# Patient Record
Sex: FEMALE | Race: OTHER | HISPANIC OR LATINO | URBAN - METROPOLITAN AREA
[De-identification: names, ages, dates, MRNs, and addresses within clinical notes are randomized per-mention and may not be internally consistent; named-entity substitution may affect disease eponyms.]

---

## 2020-04-10 ENCOUNTER — INPATIENT (INPATIENT)
Facility: HOSPITAL | Age: 80
LOS: 0 days | Discharge: ANOTHER HOSPITAL | DRG: 177 | End: 2020-04-11
Attending: INTERNAL MEDICINE | Admitting: INTERNAL MEDICINE
Payer: COMMERCIAL

## 2020-04-10 VITALS
HEIGHT: 60 IN | RESPIRATION RATE: 20 BRPM | WEIGHT: 149.91 LBS | HEART RATE: 109 BPM | DIASTOLIC BLOOD PRESSURE: 71 MMHG | TEMPERATURE: 98 F | SYSTOLIC BLOOD PRESSURE: 147 MMHG | OXYGEN SATURATION: 91 %

## 2020-04-10 DIAGNOSIS — Z01.89 ENCOUNTER FOR OTHER SPECIFIED SPECIAL EXAMINATIONS: ICD-10-CM

## 2020-04-10 DIAGNOSIS — J96.00 ACUTE RESPIRATORY FAILURE, UNSPECIFIED WHETHER WITH HYPOXIA OR HYPERCAPNIA: ICD-10-CM

## 2020-04-10 DIAGNOSIS — Z29.9 ENCOUNTER FOR PROPHYLACTIC MEASURES, UNSPECIFIED: ICD-10-CM

## 2020-04-10 DIAGNOSIS — R09.02 HYPOXEMIA: ICD-10-CM

## 2020-04-10 LAB
ALBUMIN SERPL ELPH-MCNC: 2.3 G/DL — LOW (ref 3.5–5)
ALP SERPL-CCNC: 77 U/L — SIGNIFICANT CHANGE UP (ref 40–120)
ALT FLD-CCNC: 22 U/L DA — SIGNIFICANT CHANGE UP (ref 10–60)
ANION GAP SERPL CALC-SCNC: 6 MMOL/L — SIGNIFICANT CHANGE UP (ref 5–17)
AST SERPL-CCNC: 27 U/L — SIGNIFICANT CHANGE UP (ref 10–40)
BASOPHILS # BLD AUTO: 0.01 K/UL — SIGNIFICANT CHANGE UP (ref 0–0.2)
BASOPHILS NFR BLD AUTO: 0.1 % — SIGNIFICANT CHANGE UP (ref 0–2)
BILIRUB SERPL-MCNC: 0.6 MG/DL — SIGNIFICANT CHANGE UP (ref 0.2–1.2)
BUN SERPL-MCNC: 8 MG/DL — SIGNIFICANT CHANGE UP (ref 7–18)
CALCIUM SERPL-MCNC: 8.7 MG/DL — SIGNIFICANT CHANGE UP (ref 8.4–10.5)
CHLORIDE SERPL-SCNC: 107 MMOL/L — SIGNIFICANT CHANGE UP (ref 96–108)
CO2 SERPL-SCNC: 29 MMOL/L — SIGNIFICANT CHANGE UP (ref 22–31)
CREAT SERPL-MCNC: 0.59 MG/DL — SIGNIFICANT CHANGE UP (ref 0.5–1.3)
D DIMER BLD IA.RAPID-MCNC: 3448 NG/ML DDU — HIGH
EOSINOPHIL # BLD AUTO: 0.09 K/UL — SIGNIFICANT CHANGE UP (ref 0–0.5)
EOSINOPHIL NFR BLD AUTO: 1.1 % — SIGNIFICANT CHANGE UP (ref 0–6)
FIBRINOGEN PPP-MCNC: 1014 MG/DL — HIGH (ref 350–510)
GLUCOSE SERPL-MCNC: 106 MG/DL — HIGH (ref 70–99)
HCT VFR BLD CALC: 36.2 % — SIGNIFICANT CHANGE UP (ref 34.5–45)
HGB BLD-MCNC: 11.6 G/DL — SIGNIFICANT CHANGE UP (ref 11.5–15.5)
IMM GRANULOCYTES NFR BLD AUTO: 1 % — SIGNIFICANT CHANGE UP (ref 0–1.5)
LACTATE SERPL-SCNC: 2.1 MMOL/L — HIGH (ref 0.7–2)
LDH SERPL L TO P-CCNC: 420 U/L — HIGH (ref 120–225)
LYMPHOCYTES # BLD AUTO: 0.5 K/UL — LOW (ref 1–3.3)
LYMPHOCYTES # BLD AUTO: 6 % — LOW (ref 13–44)
MCHC RBC-ENTMCNC: 30.3 PG — SIGNIFICANT CHANGE UP (ref 27–34)
MCHC RBC-ENTMCNC: 32 GM/DL — SIGNIFICANT CHANGE UP (ref 32–36)
MCV RBC AUTO: 94.5 FL — SIGNIFICANT CHANGE UP (ref 80–100)
MONOCYTES # BLD AUTO: 0.67 K/UL — SIGNIFICANT CHANGE UP (ref 0–0.9)
MONOCYTES NFR BLD AUTO: 8 % — SIGNIFICANT CHANGE UP (ref 2–14)
NEUTROPHILS # BLD AUTO: 7.02 K/UL — SIGNIFICANT CHANGE UP (ref 1.8–7.4)
NEUTROPHILS NFR BLD AUTO: 83.8 % — HIGH (ref 43–77)
NRBC # BLD: 0 /100 WBCS — SIGNIFICANT CHANGE UP (ref 0–0)
PLATELET # BLD AUTO: 369 K/UL — SIGNIFICANT CHANGE UP (ref 150–400)
POTASSIUM SERPL-MCNC: 3.6 MMOL/L — SIGNIFICANT CHANGE UP (ref 3.5–5.3)
POTASSIUM SERPL-SCNC: 3.6 MMOL/L — SIGNIFICANT CHANGE UP (ref 3.5–5.3)
PROT SERPL-MCNC: 7.6 G/DL — SIGNIFICANT CHANGE UP (ref 6–8.3)
RBC # BLD: 3.83 M/UL — SIGNIFICANT CHANGE UP (ref 3.8–5.2)
RBC # FLD: 13.7 % — SIGNIFICANT CHANGE UP (ref 10.3–14.5)
SODIUM SERPL-SCNC: 142 MMOL/L — SIGNIFICANT CHANGE UP (ref 135–145)
WBC # BLD: 8.37 K/UL — SIGNIFICANT CHANGE UP (ref 3.8–10.5)
WBC # FLD AUTO: 8.37 K/UL — SIGNIFICANT CHANGE UP (ref 3.8–10.5)

## 2020-04-10 PROCEDURE — 71045 X-RAY EXAM CHEST 1 VIEW: CPT | Mod: 26

## 2020-04-10 PROCEDURE — 99285 EMERGENCY DEPT VISIT HI MDM: CPT

## 2020-04-10 RX ORDER — HYDROXYCHLOROQUINE SULFATE 200 MG
800 TABLET ORAL EVERY 24 HOURS
Refills: 0 | Status: COMPLETED | OUTPATIENT
Start: 2020-04-11 | End: 2020-04-11

## 2020-04-10 RX ORDER — HYDROXYCHLOROQUINE SULFATE 200 MG
TABLET ORAL
Refills: 0 | Status: DISCONTINUED | OUTPATIENT
Start: 2020-04-10 | End: 2020-04-11

## 2020-04-10 RX ORDER — SODIUM CHLORIDE 9 MG/ML
1000 INJECTION INTRAMUSCULAR; INTRAVENOUS; SUBCUTANEOUS ONCE
Refills: 0 | Status: COMPLETED | OUTPATIENT
Start: 2020-04-10 | End: 2020-04-10

## 2020-04-10 RX ORDER — ENOXAPARIN SODIUM 100 MG/ML
40 INJECTION SUBCUTANEOUS
Refills: 0 | Status: DISCONTINUED | OUTPATIENT
Start: 2020-04-10 | End: 2020-04-10

## 2020-04-10 RX ORDER — INSULIN LISPRO 100/ML
VIAL (ML) SUBCUTANEOUS
Refills: 0 | Status: DISCONTINUED | OUTPATIENT
Start: 2020-04-10 | End: 2020-04-11

## 2020-04-10 RX ORDER — ENOXAPARIN SODIUM 100 MG/ML
70 INJECTION SUBCUTANEOUS
Refills: 0 | Status: DISCONTINUED | OUTPATIENT
Start: 2020-04-10 | End: 2020-04-11

## 2020-04-10 RX ORDER — ACETAMINOPHEN 500 MG
650 TABLET ORAL EVERY 4 HOURS
Refills: 0 | Status: DISCONTINUED | OUTPATIENT
Start: 2020-04-10 | End: 2020-04-11

## 2020-04-10 RX ORDER — HYDROXYCHLOROQUINE SULFATE 200 MG
400 TABLET ORAL EVERY 24 HOURS
Refills: 0 | Status: CANCELLED | OUTPATIENT
Start: 2020-04-12 | End: 2020-04-11

## 2020-04-10 RX ADMIN — SODIUM CHLORIDE 1000 MILLILITER(S): 9 INJECTION INTRAMUSCULAR; INTRAVENOUS; SUBCUTANEOUS at 14:24

## 2020-04-10 RX ADMIN — ENOXAPARIN SODIUM 70 MILLIGRAM(S): 100 INJECTION SUBCUTANEOUS at 20:50

## 2020-04-10 RX ADMIN — Medication 650 MILLIGRAM(S): at 20:50

## 2020-04-10 NOTE — H&P ADULT - PROBLEM SELECTOR PLAN 3
IMPROVE VTE Individual Risk Assessment    RISK                                                                Points  [  ] Previous VTE                                                  3  [  ] Thrombophilia                                               2  [  ] Lower limb paralysis                                      2        (unable to hold up >15 seconds)    [  ] Current Cancer                                              2         (within 6 months)  [x ] Immobilization > 24 hrs                                1  [  ] ICU/CCU stay > 24 hours                              1  [x  ] Age > 60                                                      1  IMPROVE VTE Score __DVT ppx Lovenox 70 q 12 ____  )

## 2020-04-10 NOTE — ED ADULT NURSE NOTE - NSIMPLEMENTINTERV_GEN_ALL_ED
Implemented All Fall Risk Interventions:  [place] to call system. Call bell, personal items and telephone within reach. Instruct patient to call for assistance. Room bathroom lighting operational. Non-slip footwear when patient is off stretcher. Physically safe environment: no spills, clutter or unnecessary equipment. Stretcher in lowest position, wheels locked, appropriate side rails in place. Provide visual cue, wrist band, yellow gown, etc. Monitor gait and stability. Monitor for mental status changes and reorient to person, place, and time. Review medications for side effects contributing to fall risk. Reinforce activity limits and safety measures with patient and family.

## 2020-04-10 NOTE — H&P ADULT - HISTORY OF PRESENT ILLNESS
79y /o F with PMH of  htn presenting with  cough since 1 week. It is a/w  Body ache, loss of appetite and generalized weakness. Pt  denies fever. cp, abd pain, diarrhea.  GOC Full code

## 2020-04-10 NOTE — ED PROVIDER NOTE - CLINICAL SUMMARY MEDICAL DECISION MAKING FREE TEXT BOX
Patient presenting with sob, weakness. vital stable but noting hypoxia. patient denies home o2 use. will obtain lab, xray, assess for covid, pna. ed obs and reassess

## 2020-04-10 NOTE — H&P ADULT - ASSESSMENT
79y /o F with PMH of  htn presenting with  cough since 1 week. Admitted to medicine for Pneumonia and suspicion for COVID.

## 2020-04-10 NOTE — H&P ADULT - NSHPPHYSICALEXAM_GEN_ALL_CORE
Vital Signs Last 24 Hrs  T(C): 37.9 (10 Apr 2020 19:08), Max: 37.9 (10 Apr 2020 19:08)  T(F): 100.3 (10 Apr 2020 19:08), Max: 100.3 (10 Apr 2020 19:08)  HR: 104 (10 Apr 2020 19:08) (104 - 109)  BP: 126/65 (10 Apr 2020 19:08) (126/65 - 147/71)  BP(mean): --  RR: 19 (10 Apr 2020 19:08) (19 - 20)  SpO2: 93% (10 Apr 2020 19:08) (91% - 93%)  PHYSICAL EXAM:  GENERAL: NAD, obese  HEAD:  Atraumatic, Normocephalic  EYES:  conjunctiva and sclera clear  NECK: Supple, No JVD, Normal thyroid  CHEST/LUNG: DECREASED BREATH SOUNDS BILATERALLY   HEART: Regular rate and rhythm; No murmurs, rubs, or gallops  ABDOMEN: Soft, Nontender, Nondistended; Bowel sounds present  NERVOUS SYSTEM:  Alert & Oriented X3,    EXTREMITIES:  2+ Peripheral Pulses, No clubbing, cyanosis, or edema  SKIN: warm dry

## 2020-04-10 NOTE — H&P ADULT - NSHPREVIEWOFSYSTEMS_GEN_ALL_CORE
REVIEW OF SYSTEMS:    CONSTITUTIONAL:  weakness,   EYES/ENT: No visual changes;  No vertigo or throat pain   NECK: No pain or stiffness  RESPIRATORY:  shortness of breath  CARDIOVASCULAR: No chest pain or palpitations  GASTROINTESTINAL: No abdominal or epigastric pain. No nausea, vomiting, or hematemesis; No diarrhea or constipation. No melena or hematochezia.  GENITOURINARY: No dysuria, frequency or hematuria  NEUROLOGICAL: No numbness or weakness  SKIN: No itching, burning, rashes, or lesions   All other review of systems is negative unless indicated above.

## 2020-04-10 NOTE — H&P ADULT - PROBLEM SELECTOR PLAN 1
Pt is hypoxic on admission with RR 20 and saturating 91  on RA.  Currently pt is saturating 96% on 2L  NC  c/w Oxygen supplementation and maintain >90%   CXR : Bilateral opacities are present   f/u Flu, COVID, RVP  f/u D dimer, Ferritin, LDH, Procalcitonin, ESR, CRP  f/u Strep, Mycoplasma, Legionella  Isolation precautions  Started on Hydroxychloroquine

## 2020-04-10 NOTE — H&P ADULT - NSHPLABSRESULTS_GEN_ALL_CORE
LABS:                        11.6   8.37  )-----------( 369      ( 10 Apr 2020 14:29 )             36.2     04-10    142  |  107  |  8   ----------------------------<  106<H>  3.6   |  29  |  0.59    Ca    8.7      10 Apr 2020 14:29    TPro  7.6  /  Alb  2.3<L>  /  TBili  0.6  /  DBili  x   /  AST  27  /  ALT  22  /  AlkPhos  77  04-10        LIVER FUNCTIONS - ( 10 Apr 2020 14:29 )  Alb: 2.3 g/dL / Pro: 7.6 g/dL / ALK PHOS: 77 U/L / ALT: 22 U/L DA / AST: 27 U/L / GGT: x           Lactate, Blood: 2.1 mmol/L (04-10-20 @ 14:29)

## 2020-04-10 NOTE — ED PROVIDER NOTE - OBJECTIVE STATEMENT
79 y.o w/ htn presenting with 1 week of bodyache, cough, n, loss of appetite and generalized weakness. denies fever. cp, abd pain, diarrhea.

## 2020-04-11 ENCOUNTER — INPATIENT (INPATIENT)
Facility: HOSPITAL | Age: 80
LOS: 4 days | Discharge: HOME HEALTH | DRG: 177 | End: 2020-04-16
Attending: HOSPITALIST | Admitting: HOSPITALIST
Payer: MEDICARE

## 2020-04-11 VITALS
DIASTOLIC BLOOD PRESSURE: 57 MMHG | TEMPERATURE: 98 F | OXYGEN SATURATION: 95 % | WEIGHT: 148.59 LBS | SYSTOLIC BLOOD PRESSURE: 139 MMHG | RESPIRATION RATE: 22 BRPM | HEART RATE: 91 BPM

## 2020-04-11 VITALS
SYSTOLIC BLOOD PRESSURE: 148 MMHG | DIASTOLIC BLOOD PRESSURE: 76 MMHG | WEIGHT: 149.91 LBS | RESPIRATION RATE: 18 BRPM | HEIGHT: 60 IN | TEMPERATURE: 98 F | OXYGEN SATURATION: 92 % | HEART RATE: 95 BPM

## 2020-04-11 DIAGNOSIS — R63.8 OTHER SYMPTOMS AND SIGNS CONCERNING FOOD AND FLUID INTAKE: ICD-10-CM

## 2020-04-11 DIAGNOSIS — J45.909 UNSPECIFIED ASTHMA, UNCOMPLICATED: ICD-10-CM

## 2020-04-11 DIAGNOSIS — J18.9 PNEUMONIA, UNSPECIFIED ORGANISM: ICD-10-CM

## 2020-04-11 DIAGNOSIS — J96.91 RESPIRATORY FAILURE, UNSPECIFIED WITH HYPOXIA: ICD-10-CM

## 2020-04-11 LAB
ALBUMIN SERPL ELPH-MCNC: 2.7 G/DL — LOW (ref 3.3–5)
ALP SERPL-CCNC: 65 U/L — SIGNIFICANT CHANGE UP (ref 40–120)
ALT FLD-CCNC: 16 U/L — SIGNIFICANT CHANGE UP (ref 10–45)
ANION GAP SERPL CALC-SCNC: 11 MMOL/L — SIGNIFICANT CHANGE UP (ref 5–17)
AST SERPL-CCNC: 16 U/L — SIGNIFICANT CHANGE UP (ref 10–40)
BASOPHILS # BLD AUTO: 0 K/UL — SIGNIFICANT CHANGE UP (ref 0–0.2)
BASOPHILS # BLD AUTO: 0.01 K/UL — SIGNIFICANT CHANGE UP (ref 0–0.2)
BASOPHILS NFR BLD AUTO: 0 % — SIGNIFICANT CHANGE UP (ref 0–2)
BASOPHILS NFR BLD AUTO: 0.2 % — SIGNIFICANT CHANGE UP (ref 0–2)
BILIRUB SERPL-MCNC: 0.4 MG/DL — SIGNIFICANT CHANGE UP (ref 0.2–1.2)
BUN SERPL-MCNC: 8 MG/DL — SIGNIFICANT CHANGE UP (ref 7–23)
CALCIUM SERPL-MCNC: 8.5 MG/DL — SIGNIFICANT CHANGE UP (ref 8.4–10.5)
CHLORIDE SERPL-SCNC: 106 MMOL/L — SIGNIFICANT CHANGE UP (ref 96–108)
CO2 SERPL-SCNC: 23 MMOL/L — SIGNIFICANT CHANGE UP (ref 22–31)
CREAT SERPL-MCNC: 0.5 MG/DL — SIGNIFICANT CHANGE UP (ref 0.5–1.3)
CRP SERPL-MCNC: 27.54 MG/DL — HIGH (ref 0–0.4)
CRP SERPL-MCNC: 28.68 MG/DL — HIGH (ref 0–0.4)
D DIMER BLD IA.RAPID-MCNC: 3202 NG/ML DDU — HIGH
EOSINOPHIL # BLD AUTO: 0 K/UL — SIGNIFICANT CHANGE UP (ref 0–0.5)
EOSINOPHIL # BLD AUTO: 0 K/UL — SIGNIFICANT CHANGE UP (ref 0–0.5)
EOSINOPHIL NFR BLD AUTO: 0 % — SIGNIFICANT CHANGE UP (ref 0–6)
EOSINOPHIL NFR BLD AUTO: 0 % — SIGNIFICANT CHANGE UP (ref 0–6)
ERYTHROCYTE [SEDIMENTATION RATE] IN BLOOD: 125 MM/HR — HIGH
FERRITIN SERPL-MCNC: 366 NG/ML — HIGH (ref 15–150)
FERRITIN SERPL-MCNC: 377 NG/ML — HIGH (ref 15–150)
FERRITIN SERPL-MCNC: 445 NG/ML — HIGH (ref 15–150)
GLUCOSE BLDC GLUCOMTR-MCNC: 96 MG/DL — SIGNIFICANT CHANGE UP (ref 70–99)
GLUCOSE SERPL-MCNC: 116 MG/DL — HIGH (ref 70–99)
HCT VFR BLD CALC: 32.9 % — LOW (ref 34.5–45)
HCT VFR BLD CALC: 33.5 % — LOW (ref 34.5–45)
HGB BLD-MCNC: 10.5 G/DL — LOW (ref 11.5–15.5)
HGB BLD-MCNC: 10.6 G/DL — LOW (ref 11.5–15.5)
IMM GRANULOCYTES NFR BLD AUTO: 0.6 % — SIGNIFICANT CHANGE UP (ref 0–1.5)
LYMPHOCYTES # BLD AUTO: 0.28 K/UL — LOW (ref 1–3.3)
LYMPHOCYTES # BLD AUTO: 0.37 K/UL — LOW (ref 1–3.3)
LYMPHOCYTES # BLD AUTO: 4.4 % — LOW (ref 13–44)
LYMPHOCYTES # BLD AUTO: 5.8 % — LOW (ref 13–44)
MAGNESIUM SERPL-MCNC: 2.2 MG/DL — SIGNIFICANT CHANGE UP (ref 1.6–2.6)
MCHC RBC-ENTMCNC: 30 PG — SIGNIFICANT CHANGE UP (ref 27–34)
MCHC RBC-ENTMCNC: 30.9 PG — SIGNIFICANT CHANGE UP (ref 27–34)
MCHC RBC-ENTMCNC: 31.3 GM/DL — LOW (ref 32–36)
MCHC RBC-ENTMCNC: 32.2 GM/DL — SIGNIFICANT CHANGE UP (ref 32–36)
MCV RBC AUTO: 95.7 FL — SIGNIFICANT CHANGE UP (ref 80–100)
MCV RBC AUTO: 95.9 FL — SIGNIFICANT CHANGE UP (ref 80–100)
MONOCYTES # BLD AUTO: 0.16 K/UL — SIGNIFICANT CHANGE UP (ref 0–0.9)
MONOCYTES # BLD AUTO: 0.2 K/UL — SIGNIFICANT CHANGE UP (ref 0–0.9)
MONOCYTES NFR BLD AUTO: 2.6 % — SIGNIFICANT CHANGE UP (ref 2–14)
MONOCYTES NFR BLD AUTO: 3.1 % — SIGNIFICANT CHANGE UP (ref 2–14)
NEUTROPHILS # BLD AUTO: 5.74 K/UL — SIGNIFICANT CHANGE UP (ref 1.8–7.4)
NEUTROPHILS # BLD AUTO: 5.77 K/UL — SIGNIFICANT CHANGE UP (ref 1.8–7.4)
NEUTROPHILS NFR BLD AUTO: 90.3 % — HIGH (ref 43–77)
NEUTROPHILS NFR BLD AUTO: 91.2 % — HIGH (ref 43–77)
NRBC # BLD: 0 /100 WBCS — SIGNIFICANT CHANGE UP (ref 0–0)
NRBC # BLD: SIGNIFICANT CHANGE UP /100 WBCS (ref 0–0)
PLATELET # BLD AUTO: 347 K/UL — SIGNIFICANT CHANGE UP (ref 150–400)
PLATELET # BLD AUTO: 354 K/UL — SIGNIFICANT CHANGE UP (ref 150–400)
POTASSIUM SERPL-MCNC: 4.2 MMOL/L — SIGNIFICANT CHANGE UP (ref 3.5–5.3)
POTASSIUM SERPL-SCNC: 4.2 MMOL/L — SIGNIFICANT CHANGE UP (ref 3.5–5.3)
PROCALCITONIN SERPL-MCNC: 26.2 NG/ML — HIGH (ref 0.02–0.1)
PROCALCITONIN SERPL-MCNC: 35.91 NG/ML — HIGH (ref 0.02–0.1)
PROT SERPL-MCNC: 6.6 G/DL — SIGNIFICANT CHANGE UP (ref 6–8.3)
RBC # BLD: 3.43 M/UL — LOW (ref 3.8–5.2)
RBC # BLD: 3.5 M/UL — LOW (ref 3.8–5.2)
RBC # FLD: 13.6 % — SIGNIFICANT CHANGE UP (ref 10.3–14.5)
RBC # FLD: 13.7 % — SIGNIFICANT CHANGE UP (ref 10.3–14.5)
SARS-COV-2 RNA SPEC QL NAA+PROBE: DETECTED
SARS-COV-2 RNA SPEC QL NAA+PROBE: SIGNIFICANT CHANGE UP
SARS-COV-2 RNA SPEC QL NAA+PROBE: SIGNIFICANT CHANGE UP
SODIUM SERPL-SCNC: 140 MMOL/L — SIGNIFICANT CHANGE UP (ref 135–145)
TROPONIN T SERPL-MCNC: <0.01 NG/ML — SIGNIFICANT CHANGE UP (ref 0–0.01)
WBC # BLD: 6.29 K/UL — SIGNIFICANT CHANGE UP (ref 3.8–10.5)
WBC # BLD: 6.39 K/UL — SIGNIFICANT CHANGE UP (ref 3.8–10.5)
WBC # FLD AUTO: 6.29 K/UL — SIGNIFICANT CHANGE UP (ref 3.8–10.5)
WBC # FLD AUTO: 6.39 K/UL — SIGNIFICANT CHANGE UP (ref 3.8–10.5)

## 2020-04-11 PROCEDURE — 99285 EMERGENCY DEPT VISIT HI MDM: CPT

## 2020-04-11 PROCEDURE — 82962 GLUCOSE BLOOD TEST: CPT

## 2020-04-11 PROCEDURE — 93970 EXTREMITY STUDY: CPT | Mod: 26

## 2020-04-11 PROCEDURE — 36415 COLL VENOUS BLD VENIPUNCTURE: CPT

## 2020-04-11 PROCEDURE — 83605 ASSAY OF LACTIC ACID: CPT

## 2020-04-11 PROCEDURE — 87635 SARS-COV-2 COVID-19 AMP PRB: CPT

## 2020-04-11 PROCEDURE — 93005 ELECTROCARDIOGRAM TRACING: CPT

## 2020-04-11 PROCEDURE — 86140 C-REACTIVE PROTEIN: CPT

## 2020-04-11 PROCEDURE — 85027 COMPLETE CBC AUTOMATED: CPT

## 2020-04-11 PROCEDURE — 84145 PROCALCITONIN (PCT): CPT

## 2020-04-11 PROCEDURE — 85384 FIBRINOGEN ACTIVITY: CPT

## 2020-04-11 PROCEDURE — 71045 X-RAY EXAM CHEST 1 VIEW: CPT

## 2020-04-11 PROCEDURE — 99285 EMERGENCY DEPT VISIT HI MDM: CPT | Mod: 25

## 2020-04-11 PROCEDURE — 80053 COMPREHEN METABOLIC PANEL: CPT

## 2020-04-11 PROCEDURE — 85379 FIBRIN DEGRADATION QUANT: CPT

## 2020-04-11 PROCEDURE — 82728 ASSAY OF FERRITIN: CPT

## 2020-04-11 PROCEDURE — 83615 LACTATE (LD) (LDH) ENZYME: CPT

## 2020-04-11 PROCEDURE — 71275 CT ANGIOGRAPHY CHEST: CPT | Mod: 26

## 2020-04-11 RX ORDER — AZITHROMYCIN 500 MG/1
500 TABLET, FILM COATED ORAL ONCE
Refills: 0 | Status: COMPLETED | OUTPATIENT
Start: 2020-04-11 | End: 2020-04-11

## 2020-04-11 RX ORDER — BUDESONIDE AND FORMOTEROL FUMARATE DIHYDRATE 160; 4.5 UG/1; UG/1
2 AEROSOL RESPIRATORY (INHALATION)
Refills: 0 | Status: DISCONTINUED | OUTPATIENT
Start: 2020-04-11 | End: 2020-04-16

## 2020-04-11 RX ORDER — AZITHROMYCIN 500 MG/1
250 TABLET, FILM COATED ORAL EVERY 24 HOURS
Refills: 0 | Status: DISCONTINUED | OUTPATIENT
Start: 2020-04-12 | End: 2020-04-15

## 2020-04-11 RX ORDER — FAMOTIDINE 10 MG/ML
20 INJECTION INTRAVENOUS DAILY
Refills: 0 | Status: DISCONTINUED | OUTPATIENT
Start: 2020-04-11 | End: 2020-04-14

## 2020-04-11 RX ORDER — ACETAMINOPHEN 500 MG
650 TABLET ORAL EVERY 6 HOURS
Refills: 0 | Status: DISCONTINUED | OUTPATIENT
Start: 2020-04-11 | End: 2020-04-16

## 2020-04-11 RX ORDER — ENOXAPARIN SODIUM 100 MG/ML
70 INJECTION SUBCUTANEOUS EVERY 12 HOURS
Refills: 0 | Status: DISCONTINUED | OUTPATIENT
Start: 2020-04-11 | End: 2020-04-11

## 2020-04-11 RX ORDER — ALBUTEROL 90 UG/1
0 AEROSOL, METERED ORAL
Qty: 0 | Refills: 0 | DISCHARGE

## 2020-04-11 RX ORDER — HYDROXYCHLOROQUINE SULFATE 200 MG
400 TABLET ORAL EVERY 24 HOURS
Refills: 0 | Status: COMPLETED | OUTPATIENT
Start: 2020-04-11 | End: 2020-04-14

## 2020-04-11 RX ORDER — ENOXAPARIN SODIUM 100 MG/ML
40 INJECTION SUBCUTANEOUS EVERY 24 HOURS
Refills: 0 | Status: DISCONTINUED | OUTPATIENT
Start: 2020-04-11 | End: 2020-04-11

## 2020-04-11 RX ORDER — MONTELUKAST 4 MG/1
1 TABLET, CHEWABLE ORAL
Qty: 0 | Refills: 0 | DISCHARGE

## 2020-04-11 RX ORDER — HYDROXYCHLOROQUINE SULFATE 200 MG
400 TABLET ORAL EVERY 24 HOURS
Refills: 0 | Status: DISCONTINUED | OUTPATIENT
Start: 2020-04-11 | End: 2020-04-11

## 2020-04-11 RX ORDER — LEVOTHYROXINE SODIUM 125 MCG
0 TABLET ORAL
Qty: 0 | Refills: 0 | DISCHARGE

## 2020-04-11 RX ORDER — ENOXAPARIN SODIUM 100 MG/ML
60 INJECTION SUBCUTANEOUS EVERY 24 HOURS
Refills: 0 | Status: DISCONTINUED | OUTPATIENT
Start: 2020-04-11 | End: 2020-04-11

## 2020-04-11 RX ORDER — MONTELUKAST 4 MG/1
0 TABLET, CHEWABLE ORAL
Qty: 0 | Refills: 0 | DISCHARGE

## 2020-04-11 RX ORDER — MONTELUKAST 4 MG/1
10 TABLET, CHEWABLE ORAL DAILY
Refills: 0 | Status: DISCONTINUED | OUTPATIENT
Start: 2020-04-11 | End: 2020-04-16

## 2020-04-11 RX ORDER — ENOXAPARIN SODIUM 100 MG/ML
40 INJECTION SUBCUTANEOUS EVERY 24 HOURS
Refills: 0 | Status: DISCONTINUED | OUTPATIENT
Start: 2020-04-12 | End: 2020-04-16

## 2020-04-11 RX ORDER — FLUTICASONE PROPIONATE AND SALMETEROL 50; 250 UG/1; UG/1
0 POWDER ORAL; RESPIRATORY (INHALATION)
Qty: 0 | Refills: 0 | DISCHARGE

## 2020-04-11 RX ORDER — PANTOPRAZOLE SODIUM 20 MG/1
40 TABLET, DELAYED RELEASE ORAL
Refills: 0 | Status: DISCONTINUED | OUTPATIENT
Start: 2020-04-11 | End: 2020-04-11

## 2020-04-11 RX ORDER — ALBUTEROL 90 UG/1
2 AEROSOL, METERED ORAL EVERY 6 HOURS
Refills: 0 | Status: DISCONTINUED | OUTPATIENT
Start: 2020-04-11 | End: 2020-04-16

## 2020-04-11 RX ORDER — CEFTRIAXONE 500 MG/1
1000 INJECTION, POWDER, FOR SOLUTION INTRAMUSCULAR; INTRAVENOUS EVERY 24 HOURS
Refills: 0 | Status: COMPLETED | OUTPATIENT
Start: 2020-04-11 | End: 2020-04-15

## 2020-04-11 RX ADMIN — Medication 200 MILLIGRAM(S): at 06:38

## 2020-04-11 RX ADMIN — FAMOTIDINE 20 MILLIGRAM(S): 10 INJECTION INTRAVENOUS at 12:35

## 2020-04-11 RX ADMIN — Medication 100 MILLIGRAM(S): at 16:31

## 2020-04-11 RX ADMIN — Medication 400 MILLIGRAM(S): at 12:35

## 2020-04-11 RX ADMIN — Medication 40 MILLIGRAM(S): at 00:14

## 2020-04-11 RX ADMIN — Medication 800 MILLIGRAM(S): at 00:40

## 2020-04-11 RX ADMIN — CEFTRIAXONE 100 MILLIGRAM(S): 500 INJECTION, POWDER, FOR SOLUTION INTRAMUSCULAR; INTRAVENOUS at 09:55

## 2020-04-11 RX ADMIN — MONTELUKAST 10 MILLIGRAM(S): 4 TABLET, CHEWABLE ORAL at 12:35

## 2020-04-11 RX ADMIN — BUDESONIDE AND FORMOTEROL FUMARATE DIHYDRATE 2 PUFF(S): 160; 4.5 AEROSOL RESPIRATORY (INHALATION) at 22:09

## 2020-04-11 RX ADMIN — AZITHROMYCIN 500 MILLIGRAM(S): 500 TABLET, FILM COATED ORAL at 05:02

## 2020-04-11 NOTE — ED ADULT NURSE NOTE - OBJECTIVE STATEMENT
Patient transferred from [place] for admission, with complaint of SOB, cough and mild sore throat for a week, denies fever chills chest pain.

## 2020-04-11 NOTE — ED ADULT NURSE REASSESSMENT NOTE - NS ED NURSE REASSESS COMMENT FT1
pt. resting on stretcher.  no respiratory distress at rest.   breathing unlabored,  pulse ox 97% on 2 liters o2 nasal cannula

## 2020-04-11 NOTE — H&P ADULT - NSHPPHYSICALEXAM_GEN_ALL_CORE
VITAL SIGNS:  T(C): 36.9 (04-11-20 @ 07:19), Max: 37.9 (04-10-20 @ 19:08)  T(F): 98.4 (04-11-20 @ 07:19), Max: 100.3 (04-10-20 @ 19:08)  HR: 86 (04-11-20 @ 07:19) (86 - 109)  BP: 111/55 (04-11-20 @ 07:19) (111/55 - 148/76)  BP(mean): --  RR: 20 (04-11-20 @ 07:19) (18 - 22)  SpO2: 94% (04-11-20 @ 07:19) (91% - 96%)  Wt(kg): --    PHYSICAL EXAM:  Constitutional: NAD   Head: NC/AT  Eyes: PERRL, EOMI, anicteric sclera  ENT: no nasal discharge; uvula midline, no oropharyngeal erythema or exudates; MMM  Neck: supple; no JVD or thyromegaly  Respiratory: diminished breath sounds at the bases no W/R/R, no retractions  Cardiac: +S1/S2; RRR; no M/R/G; PMI non-displaced  Gastrointestinal: soft, NT/ND; no rebound or guarding; +BSx4  Extremities: WWP, no clubbing or cyanosis; no peripheral edema  Musculoskeletal: NROM x4; no joint swelling, tenderness or erythema  Vascular: 2+ radial, femoral, DP/PT pulses B/L  Dermatologic: skin warm, dry and intact; no rashes, wounds, or scars  Lymphatic: no submandibular or cervical LAD  Neurologic: AAOx3; CNII-XII grossly intact; no focal deficits  Psychiatric: affect and characteristics of appearance, verbalizations, behaviors are appropriate

## 2020-04-11 NOTE — H&P ADULT - NSHPLABSRESULTS_GEN_ALL_CORE
LABS:                        10.6   6.39  )-----------( 347      ( 11 Apr 2020 07:53 )             32.9     04-10    142  |  107  |  8   ----------------------------<  106<H>  3.6   |  29  |  0.59    Ca    8.7      10 Apr 2020 14:29    TPro  7.6  /  Alb  2.3<L>  /  TBili  0.6  /  DBili  x   /  AST  27  /  ALT  22  /  AlkPhos  77  04-10        CAPILLARY BLOOD GLUCOSE      POCT Blood Glucose.: 96 mg/dL (11 Apr 2020 00:02)      RADIOLOGY & ADDITIONAL TESTS: Reviewed.

## 2020-04-11 NOTE — H&P ADULT - HISTORY OF PRESENT ILLNESS
HPI:    79 with PMHX of asthma who presents for a week of symptoms. She says her symptoms started 7 days ago with fever, body aches, cough and some mild dyspnea on exertion. She also complains of sore throat as well. She came to [place] because her symptoms were not improving, and she had headache and cough but no significant dyspnea. She also complains of one day of diarrhea. She says that she hasnt had a fever for a couple days however. At [facility], she received Loading dose of Plaquenil and Solumedrol.     Date of onset of fever: 7 days   Date of onset of dyspnea: No significant dyspnea   Recent Travel: None   Sick Contacts: Friend   COVID Exposure: None   Close Contacts: None     ROS:  Fevers: Y  Malaise: Y  Myalgias: Y  SOB: N       At rest: N       With exertion: Y         At baseline: N  Cough: Y          Productive: N  Nausea: N  Vomiting: N  Diarrhea: Y  Anosmia: N    PMHx:   HTN: N  DM: N  Lung Disease: N       Specify:  Cardiovascular disease: N  Malignancy: N  Immunosuppression: N  HIV: N  CKD/ESRD: N  Chronic Liver Disease: N    SoHx:  Tobacco use: N  Vape use: N  Health care worker: N

## 2020-04-11 NOTE — H&P ADULT - ASSESSMENT
79 year old with PMHX of asthma who presents for a week of fever, cough, with worsening symptoms and NEUMANN admitted for acute hypoxic respiratory failure 2/2 possible CAP vs r/o COVID.

## 2020-04-11 NOTE — H&P ADULT - PROBLEM SELECTOR PLAN 2
Patient presented with hypoxia noted on exam to 90% on RA, with some dyspnea on exertion. Placed on NC with improvement in saturation to 95-96%   - Supplemental Oxygen as needed   - Wean NC as tolerated

## 2020-04-11 NOTE — ED PROVIDER NOTE - OBJECTIVE STATEMENT
79F hx asthma, hypothyroid, transferred from [place] for admission. c/o one week of bodyaches, cough and nausea.  decreased appetite. +SOB worse with exertion.  pt was found to have O2 91% on RA, improves with NC.  pt admitted at , given lovenox, hydroxychloroquine, solumedrol.  CXR was c/w COVID.  ekg - no acute st/t wave changes.

## 2020-04-11 NOTE — DISCHARGE NOTE NURSING/CASE MANAGEMENT/SOCIAL WORK - PATIENT PORTAL LINK FT
You can access the FollowMyHealth Patient Portal offered by Health system by registering at the following website: [email or web address]. By joining [facility]’s FollowMyHealth portal, you will also be able to view your health information using other applications (apps) compatible with our system.

## 2020-04-11 NOTE — ACUTE INTERFACILITY TRANSFER NOTE - HOSPITAL COURSE
79y /o F with PMH of  htn presenting with  cough since 1 week. It is a/w  Body ache, loss of appetite and generalized weakness. Pt  denies fever. cp, abd pain, diarrhea.    High suspicion for COVID-19 due to desaturationg to 91% on RA. COVID PCR sent, started on 2L NC with improvement of saturation to 96%. Started on plaquenil and IV steroids.  Patient to be transferred

## 2020-04-11 NOTE — H&P ADULT - PROBLEM SELECTOR PLAN 1
Patient presents with 7 days of cough, and headache, with some dyspnea on exertion. On admission, chest x-ray with multifocal opacities with patchy infiltrates with concerns for PNA. Was not meeting sepsis criteria, but was noted to be hypoxia to 90-91% on RA. Elevated inflammatory markers including ferritin, d-dimer, CRP, and lymphopenia. Given hypoxia, r/o COVID. Given elevated pro-calcitonin to 26, can suspect component of CAP.   - f/u COVID-19 PCR  - C/w Plaquenil started on 4/10   - C/w with Azithromycin started on 4/11   - Continue to trend daily LFTs, ferritin, LDH, CRP, procalcitonin  - Tylenol PRN for fever, avoid NSAIDs  - Supplemental O2 via NC, NRB, wean as tolerated

## 2020-04-11 NOTE — ACUTE INTERFACILITY TRANSFER NOTE - PLAN OF CARE
Rule out COVID-19 Desaturate to 91% on RA, improved to 96% on 2L NC  COVID-19 PCR pending  - Started on hydroxy chloroquine  - Started On IV methylprednisolone with PPI and sliding scale avoid thrombosis Due to severely elevated D-dimer in the setting of suspected COVID-19 infection, started on full dose lovenox

## 2020-04-11 NOTE — ED PROVIDER NOTE - CLINICAL SUMMARY MEDICAL DECISION MAKING FREE TEXT BOX
transferred from , pt hypoxic on RA, likely covid on CXR  no acute resp distress currently  will admit for supplemental O2

## 2020-04-11 NOTE — ED ADULT NURSE NOTE - NSIMPLEMENTINTERV_GEN_ALL_ED
Implemented All Universal Safety Interventions:  [place] to call system. Call bell, personal items and telephone within reach. Instruct patient to call for assistance. Room bathroom lighting operational. Non-slip footwear when patient is off stretcher. Physically safe environment: no spills, clutter or unnecessary equipment. Stretcher in lowest position, wheels locked, appropriate side rails in place. Implemented All Fall Risk Interventions:  [place] to call system. Call bell, personal items and telephone within reach. Instruct patient to call for assistance. Room bathroom lighting operational. Non-slip footwear when patient is off stretcher. Physically safe environment: no spills, clutter or unnecessary equipment. Stretcher in lowest position, wheels locked, appropriate side rails in place. Provide visual cue, wrist band, yellow gown, etc. Monitor gait and stability. Monitor for mental status changes and reorient to person, place, and time. Review medications for side effects contributing to fall risk. Reinforce activity limits and safety measures with patient and family.

## 2020-04-11 NOTE — H&P ADULT - PROBLEM SELECTOR PLAN 3
Patient has a history of asthma, not in acute exacerbation right now.   - C/w with Albuterol PRN   - C/w with Symbicort 2 puffs daily   - c/w with Montelukast 10 mg daily
nasima estrada

## 2020-04-12 LAB
ALBUMIN SERPL ELPH-MCNC: 2.8 G/DL — LOW (ref 3.3–5)
ALP SERPL-CCNC: 65 U/L — SIGNIFICANT CHANGE UP (ref 40–120)
ALT FLD-CCNC: 15 U/L — SIGNIFICANT CHANGE UP (ref 10–45)
ANION GAP SERPL CALC-SCNC: 12 MMOL/L — SIGNIFICANT CHANGE UP (ref 5–17)
AST SERPL-CCNC: 15 U/L — SIGNIFICANT CHANGE UP (ref 10–40)
BASOPHILS # BLD AUTO: 0.02 K/UL — SIGNIFICANT CHANGE UP (ref 0–0.2)
BASOPHILS NFR BLD AUTO: 0.2 % — SIGNIFICANT CHANGE UP (ref 0–2)
BILIRUB SERPL-MCNC: 0.3 MG/DL — SIGNIFICANT CHANGE UP (ref 0.2–1.2)
BUN SERPL-MCNC: 14 MG/DL — SIGNIFICANT CHANGE UP (ref 7–23)
CALCIUM SERPL-MCNC: 9 MG/DL — SIGNIFICANT CHANGE UP (ref 8.4–10.5)
CHLORIDE SERPL-SCNC: 107 MMOL/L — SIGNIFICANT CHANGE UP (ref 96–108)
CO2 SERPL-SCNC: 25 MMOL/L — SIGNIFICANT CHANGE UP (ref 22–31)
CREAT SERPL-MCNC: 0.69 MG/DL — SIGNIFICANT CHANGE UP (ref 0.5–1.3)
CRP SERPL-MCNC: 13.77 MG/DL — HIGH (ref 0–0.4)
D DIMER BLD IA.RAPID-MCNC: 3229 NG/ML DDU — HIGH
EOSINOPHIL # BLD AUTO: 0.02 K/UL — SIGNIFICANT CHANGE UP (ref 0–0.5)
EOSINOPHIL NFR BLD AUTO: 0.2 % — SIGNIFICANT CHANGE UP (ref 0–6)
ERYTHROCYTE [SEDIMENTATION RATE] IN BLOOD: 120 MM/HR — HIGH
FERRITIN SERPL-MCNC: 369 NG/ML — HIGH (ref 15–150)
FLU A RESULT: SIGNIFICANT CHANGE UP
FLU A RESULT: SIGNIFICANT CHANGE UP
FLUAV AG NPH QL: SIGNIFICANT CHANGE UP
FLUBV AG NPH QL: SIGNIFICANT CHANGE UP
GAMMA INTERFERON BACKGROUND BLD IA-ACNC: 0.01 IU/ML — SIGNIFICANT CHANGE UP
GLUCOSE SERPL-MCNC: 106 MG/DL — HIGH (ref 70–99)
HCT VFR BLD CALC: 35.7 % — SIGNIFICANT CHANGE UP (ref 34.5–45)
HGB BLD-MCNC: 11 G/DL — LOW (ref 11.5–15.5)
IMM GRANULOCYTES NFR BLD AUTO: 1 % — SIGNIFICANT CHANGE UP (ref 0–1.5)
LYMPHOCYTES # BLD AUTO: 1.19 K/UL — SIGNIFICANT CHANGE UP (ref 1–3.3)
LYMPHOCYTES # BLD AUTO: 12.3 % — LOW (ref 13–44)
M TB IFN-G BLD-IMP: ABNORMAL
M TB IFN-G CD4+ BCKGRND COR BLD-ACNC: 0 IU/ML — SIGNIFICANT CHANGE UP
M TB IFN-G CD4+CD8+ BCKGRND COR BLD-ACNC: 0 IU/ML — SIGNIFICANT CHANGE UP
MAGNESIUM SERPL-MCNC: 2.2 MG/DL — SIGNIFICANT CHANGE UP (ref 1.6–2.6)
MCHC RBC-ENTMCNC: 30.1 PG — SIGNIFICANT CHANGE UP (ref 27–34)
MCHC RBC-ENTMCNC: 30.8 GM/DL — LOW (ref 32–36)
MCV RBC AUTO: 97.5 FL — SIGNIFICANT CHANGE UP (ref 80–100)
MONOCYTES # BLD AUTO: 0.79 K/UL — SIGNIFICANT CHANGE UP (ref 0–0.9)
MONOCYTES NFR BLD AUTO: 8.2 % — SIGNIFICANT CHANGE UP (ref 2–14)
NEUTROPHILS # BLD AUTO: 7.54 K/UL — HIGH (ref 1.8–7.4)
NEUTROPHILS NFR BLD AUTO: 78.1 % — HIGH (ref 43–77)
NRBC # BLD: 0 /100 WBCS — SIGNIFICANT CHANGE UP (ref 0–0)
PHOSPHATE SERPL-MCNC: 3 MG/DL — SIGNIFICANT CHANGE UP (ref 2.5–4.5)
PLATELET # BLD AUTO: 443 K/UL — HIGH (ref 150–400)
POTASSIUM SERPL-MCNC: 4.1 MMOL/L — SIGNIFICANT CHANGE UP (ref 3.5–5.3)
POTASSIUM SERPL-SCNC: 4.1 MMOL/L — SIGNIFICANT CHANGE UP (ref 3.5–5.3)
PROT SERPL-MCNC: 6.5 G/DL — SIGNIFICANT CHANGE UP (ref 6–8.3)
QUANT TB PLUS MITOGEN MINUS NIL: 0.02 IU/ML — SIGNIFICANT CHANGE UP
RBC # BLD: 3.66 M/UL — LOW (ref 3.8–5.2)
RBC # FLD: 14.1 % — SIGNIFICANT CHANGE UP (ref 10.3–14.5)
RSV RESULT: SIGNIFICANT CHANGE UP
RSV RNA RESP QL NAA+PROBE: SIGNIFICANT CHANGE UP
SARS-COV-2 RNA SPEC QL NAA+PROBE: SIGNIFICANT CHANGE UP
SODIUM SERPL-SCNC: 144 MMOL/L — SIGNIFICANT CHANGE UP (ref 135–145)
WBC # BLD: 9.66 K/UL — SIGNIFICANT CHANGE UP (ref 3.8–10.5)
WBC # FLD AUTO: 9.66 K/UL — SIGNIFICANT CHANGE UP (ref 3.8–10.5)

## 2020-04-12 PROCEDURE — 99232 SBSQ HOSP IP/OBS MODERATE 35: CPT | Mod: GC

## 2020-04-12 RX ORDER — ACETAMINOPHEN 500 MG
650 TABLET ORAL ONCE
Refills: 0 | Status: COMPLETED | OUTPATIENT
Start: 2020-04-12 | End: 2020-04-12

## 2020-04-12 RX ADMIN — FAMOTIDINE 20 MILLIGRAM(S): 10 INJECTION INTRAVENOUS at 11:19

## 2020-04-12 RX ADMIN — Medication 650 MILLIGRAM(S): at 17:02

## 2020-04-12 RX ADMIN — AZITHROMYCIN 250 MILLIGRAM(S): 500 TABLET, FILM COATED ORAL at 11:23

## 2020-04-12 RX ADMIN — BUDESONIDE AND FORMOTEROL FUMARATE DIHYDRATE 2 PUFF(S): 160; 4.5 AEROSOL RESPIRATORY (INHALATION) at 11:44

## 2020-04-12 RX ADMIN — Medication 100 MILLIGRAM(S): at 14:33

## 2020-04-12 RX ADMIN — Medication 400 MILLIGRAM(S): at 11:19

## 2020-04-12 RX ADMIN — BUDESONIDE AND FORMOTEROL FUMARATE DIHYDRATE 2 PUFF(S): 160; 4.5 AEROSOL RESPIRATORY (INHALATION) at 21:44

## 2020-04-12 RX ADMIN — CEFTRIAXONE 100 MILLIGRAM(S): 500 INJECTION, POWDER, FOR SOLUTION INTRAMUSCULAR; INTRAVENOUS at 11:20

## 2020-04-12 RX ADMIN — ENOXAPARIN SODIUM 40 MILLIGRAM(S): 100 INJECTION SUBCUTANEOUS at 11:20

## 2020-04-12 RX ADMIN — MONTELUKAST 10 MILLIGRAM(S): 4 TABLET, CHEWABLE ORAL at 11:19

## 2020-04-12 NOTE — PROGRESS NOTE ADULT - PROBLEM SELECTOR PLAN 2
Patient presented with hypoxia noted on exam to 90% on RA, with some dyspnea on exertion. Placed on NC with improvement in saturation to 95-96%   - Supplemental Oxygen as needed   - Wean NC as tolerated  -Pt currently 96% on RA

## 2020-04-12 NOTE — PROGRESS NOTE ADULT - PROBLEM SELECTOR PLAN 1
Patient presents with 7 days of cough, and headache, with some dyspnea on exertion. On admission, chest x-ray with multifocal opacities with patchy infiltrates with concerns for PNA. Was not meeting sepsis criteria, but was noted to be hypoxia to 90-91% on RA. Elevated inflammatory markers including ferritin, d-dimer, CRP, and lymphopenia. Given hypoxia, r/o COVID. Given elevated pro-calcitonin to 26, can suspect component of CAP.   - f/u COVID-19 PCR  - C/w Plaquenil started on 4/10   - C/w with Azithromycin started on 4/11   - Continue to trend daily LFTs, ferritin, LDH, CRP, procalcitonin  - Tylenol PRN for fever, avoid NSAIDs  - Supplemental O2 via NC, NRB, wean as tolerated  -repeat PCR ordered (4/12)

## 2020-04-12 NOTE — PROGRESS NOTE ADULT - ASSESSMENT
79 year old with PMHX of asthma who presents for a week of fever, cough, with worsening symptoms and NEUMANN admitted for acute hypoxic respiratory failure 2/2 possible CAP vs r/o COVID.   Pt currently 96% on RA, desaturates on ambulation.  Pt will be swabbed again today. 79 year old with PMHX of asthma who presents for a week of fever, cough, with worsening symptoms and NEUMANN admitted for acute hypoxic respiratory failure 2/2 possible CAP vs r/o COVID.   Pt currently 96% on RA, desaturates on ambulation.  Pt still covid negative and swabbed for RSV, influenza A and B.

## 2020-04-12 NOTE — PROGRESS NOTE ADULT - PROBLEM SELECTOR PLAN 3
Patient has a history of asthma, not in acute exacerbation right now.   - C/w with Albuterol PRN   - C/w with Symbicort 2 puffs daily   - c/w with Montelukast 10 mg daily

## 2020-04-12 NOTE — PROGRESS NOTE ADULT - SUBJECTIVE AND OBJECTIVE BOX
PA Adult Progress Note    Pt seen at bedside in no distress,  Pt c/o cough but no pain.  Denies any chest pain, SOB,  fever, chills, headaches, myalgias, abdominal pain, or n/v/d.    	  MEDICATIONS:    azithromycin   Tablet 250 milliGRAM(s) Oral every 24 hours  cefTRIAXone   IVPB 1000 milliGRAM(s) IV Intermittent every 24 hours  hydroxychloroquine 400 milliGRAM(s) Oral every 24 hours    ALBUTerol    90 MICROgram(s) HFA Inhaler 2 Puff(s) Inhalation every 6 hours PRN  budesonide 160 MICROgram(s)/formoterol 4.5 MICROgram(s) Inhaler 2 Puff(s) Inhalation two times a day  guaiFENesin   Syrup  (Sugar-Free) 100 milliGRAM(s) Oral every 6 hours PRN  montelukast 10 milliGRAM(s) Oral daily    acetaminophen   Tablet .. 650 milliGRAM(s) Oral every 6 hours PRN    famotidine    Tablet 20 milliGRAM(s) Oral daily      enoxaparin Injectable 40 milliGRAM(s) SubCutaneous every 24 hours      [PHYSICAL EXAM:  TELEMETRY:  T(C): 36.7 (04-11-20 @ 21:45), Max: 36.7 (04-11-20 @ 21:45)  HR: 88 (04-11-20 @ 21:45) (86 - 88)  BP: 132/71 (04-11-20 @ 21:45) (132/71 - 143/72)  RR: 18 (04-11-20 @ 21:45) (18 - 18)  SpO2: 96% (04-11-20 @ 21:45) (94% - 96%)    I&O's Summary                         Appearance: Normal	  HEENT:   AC/AT	  Neck: Supple, - JVD   Cardiovascular: Normal S1 S2, No JVD, No murmurs,   Respiratory: Decreased Breath Sounds/No Rales, Rhonchi, Wheezing	  Gastrointestinal:  Soft, Non-tender, + BS	  Skin: No rashes, No ecchymoses, No cyanosis  Extremities: Normal range of motion, No clubbing, cyanosis or edema  Vascular: Peripheral pulses palpable 2+ bilaterally  Neurologic: Non-focal deficits  Psychiatry: A & O x 3, Mood & affect appropriate         	    LABS:	 	                        11.0   9.66  )-----------( 443      ( 12 Apr 2020 09:27 )             35.7     04-12    144  |  107  |  14  ----------------------------<  106<H>  4.1   |  25  |  0.69    Ca    9.0      12 Apr 2020 09:27  Phos  3.0     04-12  Mg     2.2     04-12    TPro  6.5  /  Alb  2.8<L>  /  TBili  0.3  /  DBili  x   /  AST  15  /  ALT  15  /  AlkPhos  65  04-12

## 2020-04-13 LAB
ALBUMIN SERPL ELPH-MCNC: 2.5 G/DL — LOW (ref 3.3–5)
ALP SERPL-CCNC: 59 U/L — SIGNIFICANT CHANGE UP (ref 40–120)
ALT FLD-CCNC: 17 U/L — SIGNIFICANT CHANGE UP (ref 10–45)
ANION GAP SERPL CALC-SCNC: 13 MMOL/L — SIGNIFICANT CHANGE UP (ref 5–17)
AST SERPL-CCNC: 19 U/L — SIGNIFICANT CHANGE UP (ref 10–40)
BASOPHILS # BLD AUTO: 0.02 K/UL — SIGNIFICANT CHANGE UP (ref 0–0.2)
BASOPHILS NFR BLD AUTO: 0.3 % — SIGNIFICANT CHANGE UP (ref 0–2)
BILIRUB SERPL-MCNC: 0.2 MG/DL — SIGNIFICANT CHANGE UP (ref 0.2–1.2)
BUN SERPL-MCNC: 13 MG/DL — SIGNIFICANT CHANGE UP (ref 7–23)
CALCIUM SERPL-MCNC: 8.5 MG/DL — SIGNIFICANT CHANGE UP (ref 8.4–10.5)
CHLORIDE SERPL-SCNC: 105 MMOL/L — SIGNIFICANT CHANGE UP (ref 96–108)
CO2 SERPL-SCNC: 23 MMOL/L — SIGNIFICANT CHANGE UP (ref 22–31)
CREAT SERPL-MCNC: 0.6 MG/DL — SIGNIFICANT CHANGE UP (ref 0.5–1.3)
EOSINOPHIL # BLD AUTO: 0.12 K/UL — SIGNIFICANT CHANGE UP (ref 0–0.5)
EOSINOPHIL NFR BLD AUTO: 1.7 % — SIGNIFICANT CHANGE UP (ref 0–6)
FERRITIN SERPL-MCNC: 308 NG/ML — HIGH (ref 15–150)
GLUCOSE SERPL-MCNC: 82 MG/DL — SIGNIFICANT CHANGE UP (ref 70–99)
HCT VFR BLD CALC: 32.3 % — LOW (ref 34.5–45)
HGB BLD-MCNC: 10.2 G/DL — LOW (ref 11.5–15.5)
IMM GRANULOCYTES NFR BLD AUTO: 0.9 % — SIGNIFICANT CHANGE UP (ref 0–1.5)
LYMPHOCYTES # BLD AUTO: 0.9 K/UL — LOW (ref 1–3.3)
LYMPHOCYTES # BLD AUTO: 12.8 % — LOW (ref 13–44)
MCHC RBC-ENTMCNC: 30.4 PG — SIGNIFICANT CHANGE UP (ref 27–34)
MCHC RBC-ENTMCNC: 31.6 GM/DL — LOW (ref 32–36)
MCV RBC AUTO: 96.1 FL — SIGNIFICANT CHANGE UP (ref 80–100)
MONOCYTES # BLD AUTO: 0.81 K/UL — SIGNIFICANT CHANGE UP (ref 0–0.9)
MONOCYTES NFR BLD AUTO: 11.5 % — SIGNIFICANT CHANGE UP (ref 2–14)
NEUTROPHILS # BLD AUTO: 5.14 K/UL — SIGNIFICANT CHANGE UP (ref 1.8–7.4)
NEUTROPHILS NFR BLD AUTO: 72.8 % — SIGNIFICANT CHANGE UP (ref 43–77)
NRBC # BLD: 0 /100 WBCS — SIGNIFICANT CHANGE UP (ref 0–0)
PLATELET # BLD AUTO: 423 K/UL — HIGH (ref 150–400)
POTASSIUM SERPL-MCNC: 3.8 MMOL/L — SIGNIFICANT CHANGE UP (ref 3.5–5.3)
POTASSIUM SERPL-SCNC: 3.8 MMOL/L — SIGNIFICANT CHANGE UP (ref 3.5–5.3)
PROT SERPL-MCNC: 6 G/DL — SIGNIFICANT CHANGE UP (ref 6–8.3)
RBC # BLD: 3.36 M/UL — LOW (ref 3.8–5.2)
RBC # FLD: 14.1 % — SIGNIFICANT CHANGE UP (ref 10.3–14.5)
SODIUM SERPL-SCNC: 141 MMOL/L — SIGNIFICANT CHANGE UP (ref 135–145)
WBC # BLD: 7.05 K/UL — SIGNIFICANT CHANGE UP (ref 3.8–10.5)
WBC # FLD AUTO: 7.05 K/UL — SIGNIFICANT CHANGE UP (ref 3.8–10.5)

## 2020-04-13 PROCEDURE — 93010 ELECTROCARDIOGRAM REPORT: CPT

## 2020-04-13 PROCEDURE — 99231 SBSQ HOSP IP/OBS SF/LOW 25: CPT | Mod: GC

## 2020-04-13 RX ADMIN — AZITHROMYCIN 250 MILLIGRAM(S): 500 TABLET, FILM COATED ORAL at 11:42

## 2020-04-13 RX ADMIN — MONTELUKAST 10 MILLIGRAM(S): 4 TABLET, CHEWABLE ORAL at 11:42

## 2020-04-13 RX ADMIN — FAMOTIDINE 20 MILLIGRAM(S): 10 INJECTION INTRAVENOUS at 11:42

## 2020-04-13 RX ADMIN — ENOXAPARIN SODIUM 40 MILLIGRAM(S): 100 INJECTION SUBCUTANEOUS at 11:41

## 2020-04-13 RX ADMIN — CEFTRIAXONE 100 MILLIGRAM(S): 500 INJECTION, POWDER, FOR SOLUTION INTRAMUSCULAR; INTRAVENOUS at 11:44

## 2020-04-13 RX ADMIN — BUDESONIDE AND FORMOTEROL FUMARATE DIHYDRATE 2 PUFF(S): 160; 4.5 AEROSOL RESPIRATORY (INHALATION) at 11:34

## 2020-04-13 RX ADMIN — Medication 400 MILLIGRAM(S): at 11:41

## 2020-04-13 RX ADMIN — BUDESONIDE AND FORMOTEROL FUMARATE DIHYDRATE 2 PUFF(S): 160; 4.5 AEROSOL RESPIRATORY (INHALATION) at 21:55

## 2020-04-13 NOTE — PROGRESS NOTE ADULT - ASSESSMENT
79 year old with PMHX of asthma who presents for a week of fever, cough, with worsening symptoms and NEUMANN admitted for acute hypoxic respiratory failure 2/2 possible CAP vs r/o COVID.   Pt currently 96% on RA, desaturates on ambulation.  Pt still covid negative and swabbed for RSV, influenza A and B.  Pt has 3 negative Covid swabs.  Pt is currently 95% on RA and ambulates at 93% RA.  Pt's home aid will be in tomorrow to pick her up to bring home.

## 2020-04-13 NOTE — DISCHARGE NOTE PROVIDER - PROVIDER TOKENS
FREE:[LAST:[PCP],PHONE:[(   )    -],FAX:[(   )    -]] FREE:[LAST:[PCP],PHONE:[(   )    -],FAX:[(   )    -],ADDRESS:[PCP at [facility]],FOLLOWUP:[1 month],ESTABLISHEDPATIENT:[T]]

## 2020-04-13 NOTE — PROGRESS NOTE ADULT - PROBLEM SELECTOR PLAN 2
Patient presented with hypoxia noted on exam to 90% on RA, with some dyspnea on exertion. Placed on NC with improvement in saturation to 95-96%   - Supplemental Oxygen as needed   - Wean NC as tolerated  -Pt currently 95% on RA, 93% on RA with ambulation

## 2020-04-13 NOTE — DISCHARGE NOTE PROVIDER - NSDCCPCAREPLAN_GEN_ALL_CORE_FT
PRINCIPAL DISCHARGE DIAGNOSIS  Diagnosis: Pneumonia due to COVID-19 virus  Assessment and Plan of Treatment: You have tested POSITIVE for the novel coronavirus (COVID-19). Upon discharge, you must self-quarantine for 14 days, or until the Department of Health contacts you. Please wear a face mask if you are around other individuals. Try to avoid contact with house members, family, and friends for the duration of this quarantine. Please follow up with your primary care physician within 2-3 weeks of your discharge from the hospital. Please take all medications as prescribed. If you experience any worsening or recurrence of your symptoms, particularly worsening or high fever, shortness of breathe, extreme fatigue, or bloody cough please call 9-1-1 immediately or report to the nearest Emergency Department.        SECONDARY DISCHARGE DIAGNOSES  Diagnosis: Asthma  Assessment and Plan of Treatment: Please do not use a nebulizer for 14 days after discharge.  You can use your home inhalers as prescribed PRINCIPAL DISCHARGE DIAGNOSIS  Diagnosis: Pneumonia due to COVID-19 virus  Assessment and Plan of Treatment: You have tested POSITIVE for the novel coronavirus (COVID-19).   You need to take 1 dose of hydroxychloroquine 200mg 2 tabs on 4/15 and azithromycin 250mg 1 tab on 4/15 to complete COVID treament. You will also need to take apixiban (Eliquis) 5mg 2 tabs twice daily for 7 days, and then 5mg 1 tab twice daily until you follow up with your primary care provider. This for is for blood clot (DVT) prevention and your primary care provider will determine when to discontinue.  Upon discharge, you must self-quarantine for 14 days after dishcarge, or until the Department of Health contacts you. Please wear a face mask if you are around other individuals. Try to avoid contact with house members, family, and friends for the duration of this quarantine. Please follow up with your primary care physician within 2-3 weeks of your discharge from the hospital. Please take all medications as prescribed. If you experience any worsening or recurrence of your symptoms, particularly worsening or high fever, shortness of breathe, extreme fatigue, or bloody cough please call 9-1-1 immediately or report to the nearest Emergency Department.        SECONDARY DISCHARGE DIAGNOSES  Diagnosis: Asthma  Assessment and Plan of Treatment: Please do not use a nebulizer for 14 days after discharge.  You can use your home inhalers as prescribed PRINCIPAL DISCHARGE DIAGNOSIS  Diagnosis: Pneumonia due to COVID-19 virus  Assessment and Plan of Treatment: You have tested POSITIVE for the novel coronavirus (COVID-19).   You need to take hydroxychloroquine 200mg 2 tabs on 4/15, azithromycin 250mg 1 tab on 4/15, cefpodoxime 200mg 1 tab twice daily on 4/15 to complete COVID treament. You will also need to take apixiban (Eliquis) 5mg 2 tabs twice daily for 7 days, and then 5mg 1 tab twice daily until you follow up with your primary care provider. This for is for blood clot (DVT) prevention and your primary care provider will determine when to discontinue.  Upon discharge, you must self-quarantine for 14 days after dishcarge, or until the Department of Health contacts you. Please wear a face mask if you are around other individuals. Try to avoid contact with house members, family, and friends for the duration of this quarantine. Please follow up with your primary care physician within 2-3 weeks of your discharge from the hospital. Please take all medications as prescribed. If you experience any worsening or recurrence of your symptoms, particularly worsening or high fever, shortness of breathe, extreme fatigue, or bloody cough please call 9-1-1 immediately or report to the nearest Emergency Department.        SECONDARY DISCHARGE DIAGNOSES  Diagnosis: DVT prophylaxis  Assessment and Plan of Treatment: You  need to take apixiban (Eliquis) 5mg 2 tabs twice daily for 7 days (4/15 - 4/21), and then 5mg 1 tab twice daily (starting 4/22) until you follow up with your primary care provider. This for is for blood clot (DVT) prevention and your primary care provider will determine when to discontinue.    Diagnosis: Asthma  Assessment and Plan of Treatment: Please do not use a nebulizer for 14 days after discharge.  You can use your home inhalers as prescribed PRINCIPAL DISCHARGE DIAGNOSIS  Diagnosis: Pneumonia due to COVID-19 virus  Assessment and Plan of Treatment: You have tested POSITIVE for the novel coronavirus (COVID-19).   You have completed treatment for COVID incuding azithromycin, hydroxycholorquine, and ceftriaxone.   You will need to take apixiban (Eliquis) 5mg 2 tabs twice daily for 7 days, and then 5mg 1 tab twice daily until you follow up with your primary care provider. This for is for blood clot (DVT) prevention and your primary care provider will determine when to discontinue.  Upon discharge, you must self-quarantine for 14 days after dishcarge, or until the Department of Health contacts you. Please wear a face mask if you are around other individuals. Try to avoid contact with house members, family, and friends for the duration of this quarantine. Please follow up with your primary care physician within 2-3 weeks of your discharge from the hospital. Please take all medications as prescribed. If you experience any worsening or recurrence of your symptoms, particularly worsening or high fever, shortness of breathe, extreme fatigue, or bloody cough please call 9-1-1 immediately or report to the nearest Emergency Department.        SECONDARY DISCHARGE DIAGNOSES  Diagnosis: DVT prophylaxis  Assessment and Plan of Treatment: You  need to take apixiban (Eliquis) 5mg 2 tabs twice daily for 7 days (4/16 - 4/22), and then 5mg 1 tab twice daily (starting 4/23) until you follow up with your primary care provider. This for is for blood clot (DVT) prevention and your primary care provider will determine when to discontinue.    Diagnosis: Asthma  Assessment and Plan of Treatment: Please do not use a nebulizer for 14 days after discharge.  You can use your home inhalers as prescribed PRINCIPAL DISCHARGE DIAGNOSIS  Diagnosis: Pneumonia due to COVID-19 virus  Assessment and Plan of Treatment: You have tested POSITIVE for the novel coronavirus (COVID-19).   You have completed treatment for COVID incuding azithromycin, hydroxycholorquine, and ceftriaxone.   You will need to take Xarelto (rivaroxaban) 10mg once daily. This for is for blood clot (DVT) prevention and your primary care provider will determine when to discontinue.  Upon discharge, you must self-quarantine for 14 days after dishcarge, or until the Department of Health contacts you. Please wear a face mask if you are around other individuals. Try to avoid contact with house members, family, and friends for the duration of this quarantine. Please follow up with your primary care physician within 2-3 weeks of your discharge from the hospital. Please take all medications as prescribed. If you experience any worsening or recurrence of your symptoms, particularly worsening or high fever, shortness of breathe, extreme fatigue, or bloody cough please call 9-1-1 immediately or report to the nearest Emergency Department.        SECONDARY DISCHARGE DIAGNOSES  Diagnosis: DVT prophylaxis  Assessment and Plan of Treatment: You will need to take Xarelto (rivaroxaban) 10mg once daily. This for is for blood clot (DVT) prevention and your primary care provider will determine when to discontinue.    Diagnosis: Asthma  Assessment and Plan of Treatment: Please do not use a nebulizer for 14 days after discharge.  You can use your home inhalers as prescribed

## 2020-04-13 NOTE — PROGRESS NOTE ADULT - PROBLEM SELECTOR PLAN 1
Patient presents with 7 days of cough, and headache, with some dyspnea on exertion. On admission, chest x-ray with multifocal opacities with patchy infiltrates with concerns for PNA. Was not meeting sepsis criteria, but was noted to be hypoxia to 90-91% on RA. Elevated inflammatory markers including ferritin, d-dimer, CRP, and lymphopenia. Given hypoxia, r/o COVID. Given elevated pro-calcitonin to 26, can suspect component of CAP.   - f/u COVID-19 PCR  - C/w Plaquenil started on 4/10   - C/w with Azithromycin started on 4/11   - Continue to trend daily LFTs, ferritin, LDH, CRP, procalcitonin  - Tylenol PRN for fever, avoid NSAIDs  - Supplemental O2 via NC, NRB, wean as tolerated  -repeat PCR ordered (4/12), negative again

## 2020-04-13 NOTE — DISCHARGE NOTE PROVIDER - HOSPITAL COURSE
HPI:        79 with PMHX of asthma who presents for a week of symptoms. She says her symptoms started 7 days ago with fever, body aches, cough and some mild dyspnea on exertion. She also complains of sore throat as well. She came to [place] because her symptoms were not improving, and she had headache and cough but no significant dyspnea. She also complains of one day of diarrhea. She says that she hasn't had a fever for a couple days however. At [place], she received Loading dose of Plaquenil and Solumedrol.   Pt finishing the course of azithromycin and plaquenil.  She was also given ceftriaxone 1 g for 3 days.  Pt had a increasing d-dimer that was increasing in which dopplers and ct chest was ordered  with no DVT or PE.  Pt currently on RA at 95 % and ambulates at 93% RA. HPI:        79 with PMHX of asthma who presents for a week of symptoms. She says her symptoms started 7 days ago with fever, body aches, cough and some mild dyspnea on exertion. She also complains of sore throat as well. She came to [place] because her symptoms were not improving, and she had headache and cough but no significant dyspnea. She also complains of one day of diarrhea. She says that she hasn't had a fever for a couple days however. At [place], she received Loading dose of Plaquenil and Solumedrol.   Pt finishing the course of azithromycin and plaquenil.  She was also given ceftriaxone 1 g for 3 days.  Pt had a increasing d-dimer that was increasing in which dopplers and ct chest was ordered  with no DVT or PE.  Pt currently on RA at 95 % and ambulates at 93% RA.  Pt has COVID testing negative x 3. HPI:        79 with PMHX of asthma who presents for a week of symptoms. She says her symptoms started 7 days ago with fever, body aches, cough and some mild dyspnea on exertion. She also complains of sore throat as well. She came to [place] because her symptoms were not improving, and she had headache and cough but no significant dyspnea. She also complains of one day of diarrhea. She says that she hasn't had a fever for a couple days however. At [place], she received Loading dose of Plaquenil and Solumedrol.   Pt finishing the course of azithromycin and plaquenil.  She was also given ceftriaxone 1 g for 3 days.  Pt had a increasing d-dimer that was increasing in which dopplers and ct chest was ordered  with no DVT or PE.  Pt currently on RA at 95 % and ambulates at 93% RA.  Pt has COVID testing negative x 3.        Problem/Plan - 1:    ·  Problem: Pneumonia due to infectious organism, unspecified laterality, unspecified part of lung.  Plan: Patient presents with 7 days of cough, and headache, with some dyspnea on exertion. On admission, chest x-ray with multifocal opacities with patchy infiltrates with concerns for PNA. Was not meeting sepsis criteria, but was noted to be hypoxia to 90-91% on RA. Elevated inflammatory markers including ferritin, d-dimer, CRP, and lymphopenia. Given hypoxia, r/o COVID. Given elevated pro-calcitonin to 26, can suspect component of CAP.     - f/u COVID-19 PCR    - C/w Plaquenil started on 4/10     - C/w with Azithromycin started on 4/11     - Continue to trend daily LFTs, ferritin, LDH, CRP, procalcitonin    - Tylenol PRN for fever, avoid NSAIDs    - RA    - DVT PPX Lovenox 40mg daily. Transition to eliquis at discharge.         Problem/Plan - 2:    ·  Problem: Respiratory failure with hypoxia, unspecified chronicity.  Plan: Patient presented with hypoxia noted on exam to 90% on RA, with some dyspnea on exertion. Placed on NC with improvement in saturation to 95-96%     - Supplemental Oxygen as needed     - Wean NC as tolerated    -Pt currently 95% on RA, 93% on RA with ambulation.          Problem/Plan - 3:  (Data referenced from "Progress Note Adult" 12-Apr-2020 12:15)    ·  Problem: Asthma, unspecified asthma severity, unspecified whether complicated, unspecified whether persistent.  Plan: Patient has a history of asthma, not in acute exacerbation right now.     - C/w with Albuterol PRN     - C/w with Symbicort 2 puffs daily     - c/w with Montelukast 10 mg daily. HPI:        79 with PMHX of asthma who presents for a week of symptoms. She says her symptoms started 7 days ago with fever, body aches, cough and some mild dyspnea on exertion. She also complains of sore throat as well. She came to [place] because her symptoms were not improving, and she had headache and cough but no significant dyspnea. She also complains of one day of diarrhea. She says that she hasn't had a fever for a couple days however. At [place], she received Loading dose of Plaquenil and Solumedrol.   Pt finishing the course of azithromycin and plaquenil.  She was also given ceftriaxone 1 g for 3 days.  Pt had a increasing d-dimer that was increasing in which dopplers and ct chest was ordered  with no DVT or PE.  Pt currently on RA at 95 % and ambulates at 93% RA.  Pt has COVID testing negative x 3.        Problem/Plan - 1:    ·  Problem: Pneumonia due to infectious organism, unspecified laterality, unspecified part of lung.  Plan: Patient presents with 7 days of cough, and headache, with some dyspnea on exertion. On admission, chest x-ray with multifocal opacities with patchy infiltrates with concerns for PNA. Was not meeting sepsis criteria, but was noted to be hypoxia to 90-91% on RA. Elevated inflammatory markers including ferritin, d-dimer, CRP, and lymphopenia. Given hypoxia, r/o COVID. Given elevated pro-calcitonin to 26, can suspect component of CAP.     - f/u COVID-19 PCR    - C/w Plaquenil started on 4/10     - C/w with Azithromycin started on 4/11     - Continue to trend daily LFTs, ferritin, LDH, CRP, procalcitonin    - Tylenol PRN for fever, avoid NSAIDs    - RA    - DVT PPX Lovenox 40mg daily. Transition to xarelto 10mg daily for 39 days at discharge.         Problem/Plan - 2:    ·  Problem: Respiratory failure with hypoxia, unspecified chronicity.  Plan: Patient presented with hypoxia noted on exam to 90% on RA, with some dyspnea on exertion. Placed on NC with improvement in saturation to 95-96%     - Supplemental Oxygen as needed     - Wean NC as tolerated    -Pt currently 95% on RA, 93% on RA with ambulation.          Problem/Plan - 3:  (Data referenced from "Progress Note Adult" 12-Apr-2020 12:15)    ·  Problem: Asthma, unspecified asthma severity, unspecified whether complicated, unspecified whether persistent.  Plan: Patient has a history of asthma, not in acute exacerbation right now.     - C/w with Albuterol PRN     - C/w with Symbicort 2 puffs daily     - c/w with Montelukast 10 mg daily.

## 2020-04-13 NOTE — DISCHARGE NOTE PROVIDER - NSDCMRMEDTOKEN_GEN_ALL_CORE_FT
albuterol:   montelukast 10 mg oral tablet: 1 tab(s) orally once a day  Wixela Inhub 100 mcg-50 mcg inhalation powder: albuterol:   apixaban 5 mg oral tablet: 1 tab(s) orally 2 times a day   azithromycin 250 mg oral tablet: 1 tab(s) orally every 24 hours  Eliquis 5 mg oral tablet: 2 tab(s) orally 2 times a day   guaiFENesin 100 mg/5 mL oral liquid: 5 milliliter(s) orally every 6 hours, As needed, Cough  hydroxychloroquine 200 mg oral tablet: 2 tab(s) orally every 24 hours  montelukast 10 mg oral tablet: 1 tab(s) orally once a day  Wixela Inhub 100 mcg-50 mcg inhalation powder: albuterol:   apixaban 5 mg oral tablet: 1 tab(s) orally 2 times a day   azithromycin 250 mg oral tablet: 1 tab(s) orally every 24 hours  cefpodoxime 200 mg oral tablet: 1 tab(s) orally 2 times a day   Eliquis 5 mg oral tablet: 2 tab(s) orally 2 times a day   guaiFENesin 100 mg/5 mL oral liquid: 5 milliliter(s) orally every 6 hours, As needed, Cough  hydroxychloroquine 200 mg oral tablet: 2 tab(s) orally every 24 hours  montelukast 10 mg oral tablet: 1 tab(s) orally once a day  Wixela Inhub 100 mcg-50 mcg inhalation powder: albuterol:   apixaban 5 mg oral tablet: 1 tab(s) orally 2 times a day   Eliquis 5 mg oral tablet: 2 tab(s) orally 2 times a day   guaiFENesin 100 mg/5 mL oral liquid: 5 milliliter(s) orally every 6 hours, As needed, Cough  montelukast 10 mg oral tablet: 1 tab(s) orally once a day  Wixela Inhub 100 mcg-50 mcg inhalation powder: albuterol:   Eliquis Starter Pack for Treatment of DVT and PE 5 mg oral tablet: 2 tab(s) orally 2 times a day x 7 days, then 1tab t 2 times a day until follow up with primary care provider  guaiFENesin 100 mg/5 mL oral liquid: 5 milliliter(s) orally every 6 hours, As needed, Cough  montelukast 10 mg oral tablet: 1 tab(s) orally once a day  Wixela Inhub 100 mcg-50 mcg inhalation powder: acetaminophen 325 mg oral tablet: 2 tab(s) orally every 6 hours, As needed, Moderate Pain (4 - 6)  albuterol:   guaiFENesin 100 mg/5 mL oral liquid: 5 milliliter(s) orally every 6 hours, As needed, Cough  montelukast 10 mg oral tablet: 1 tab(s) orally once a day  Wixela Inhub 100 mcg-50 mcg inhalation powder:   Xarelto 10 mg oral tablet: 1 tab(s) orally once a day

## 2020-04-13 NOTE — DISCHARGE NOTE PROVIDER - CARE PROVIDER_API CALL
PCP,   Phone: (   )    -  Fax: (   )    -  Follow Up Time: PCP,   PCP at [facility]  Phone: (   )    -  Fax: (   )    -  Established Patient  Follow Up Time: 1 month

## 2020-04-13 NOTE — DISCHARGE NOTE PROVIDER - NSDCFUADDAPPT_GEN_ALL_CORE_FT
Please follow up with primary care provider within one month fo discharge. Please follow up with primary care provider At [facility] within one month after discharge.

## 2020-04-13 NOTE — PROGRESS NOTE ADULT - SUBJECTIVE AND OBJECTIVE BOX
PA Adult Progress Note    Pt seen at bedside in am.  Pt had no complaints besides her feet hurt despite negative dopplers.  No calf pain.  She also c/o her phone that needs to be charged.  Pt denies any chest pain, SOB, cough, fever, chills, headaches, myalgias, abdominal pain, or n/v/d.    	  MEDICATIONS:    azithromycin   Tablet 250 milliGRAM(s) Oral every 24 hours  cefTRIAXone   IVPB 1000 milliGRAM(s) IV Intermittent every 24 hours  hydroxychloroquine 400 milliGRAM(s) Oral every 24 hours    ALBUTerol    90 MICROgram(s) HFA Inhaler 2 Puff(s) Inhalation every 6 hours PRN  budesonide 160 MICROgram(s)/formoterol 4.5 MICROgram(s) Inhaler 2 Puff(s) Inhalation two times a day  guaiFENesin   Syrup  (Sugar-Free) 100 milliGRAM(s) Oral every 6 hours PRN  montelukast 10 milliGRAM(s) Oral daily    acetaminophen   Tablet .. 650 milliGRAM(s) Oral every 6 hours PRN    famotidine    Tablet 20 milliGRAM(s) Oral daily      enoxaparin Injectable 40 milliGRAM(s) SubCutaneous every 24 hours    [PHYSICAL EXAM:  TELEMETRY:  T(C): 36.8 (20 @ 07:39), Max: 36.9 (20 @ 23:30)  HR: 83 (20 @ 07:39) (83 - 87)  BP: 119/64 (20 @ 07:39) (117/56 - 128/64)  RR: 18 (20 @ 07:39) (18 - 20)  SpO2: 92% (20 @ 11:59) (91% - 95%)                                   Appearance: Normal	  HEENT:   NC/AT	  Neck: Supple   Cardiovascular: Normal S1 S2, No JVD, No murmurs,   Respiratory: Decreased Breath Sounds/No Rales, Rhonchi, Wheezing	  Gastrointestinal:  Soft, Non-tender, + BS	  Skin: No rashes, No ecchymoses, No cyanosis  Extremities: Normal range of motion, No clubbing, cyanosis or edema  Vascular: Peripheral pulses palpable 2+ bilaterally  Neurologic: Non-focal deficits  Psychiatry: A & O x 3, Mood & affect appropriate  	    EC QTC ()   	  LABS:	 	                        10.2   7.05  )-----------( 423      ( 2020 09:56 )             32.3     -    141  |  105  |  13  ----------------------------<  82  3.8   |  23  |  0.60    Ca    8.5      2020 09:56  Phos  3.0     04-12  Mg     2.2     12    TPro  6.0  /  Alb  2.5<L>  /  TBili  0.2  /  DBili  x   /  AST  19  /  ALT  17  /  AlkPhos  59

## 2020-04-14 LAB
ALBUMIN SERPL ELPH-MCNC: 2.9 G/DL — LOW (ref 3.3–5)
ALP SERPL-CCNC: 55 U/L — SIGNIFICANT CHANGE UP (ref 40–120)
ALT FLD-CCNC: 17 U/L — SIGNIFICANT CHANGE UP (ref 10–45)
ANION GAP SERPL CALC-SCNC: 10 MMOL/L — SIGNIFICANT CHANGE UP (ref 5–17)
AST SERPL-CCNC: 19 U/L — SIGNIFICANT CHANGE UP (ref 10–40)
BASOPHILS # BLD AUTO: 0.03 K/UL — SIGNIFICANT CHANGE UP (ref 0–0.2)
BASOPHILS NFR BLD AUTO: 0.5 % — SIGNIFICANT CHANGE UP (ref 0–2)
BILIRUB SERPL-MCNC: 0.3 MG/DL — SIGNIFICANT CHANGE UP (ref 0.2–1.2)
BUN SERPL-MCNC: 9 MG/DL — SIGNIFICANT CHANGE UP (ref 7–23)
CALCIUM SERPL-MCNC: 8.6 MG/DL — SIGNIFICANT CHANGE UP (ref 8.4–10.5)
CHLORIDE SERPL-SCNC: 106 MMOL/L — SIGNIFICANT CHANGE UP (ref 96–108)
CO2 SERPL-SCNC: 25 MMOL/L — SIGNIFICANT CHANGE UP (ref 22–31)
CREAT SERPL-MCNC: 0.56 MG/DL — SIGNIFICANT CHANGE UP (ref 0.5–1.3)
CRP SERPL-MCNC: 3.65 MG/DL — HIGH (ref 0–0.4)
D DIMER BLD IA.RAPID-MCNC: 3355 NG/ML DDU — HIGH
EOSINOPHIL # BLD AUTO: 0.17 K/UL — SIGNIFICANT CHANGE UP (ref 0–0.5)
EOSINOPHIL NFR BLD AUTO: 2.9 % — SIGNIFICANT CHANGE UP (ref 0–6)
FERRITIN SERPL-MCNC: 273 NG/ML — HIGH (ref 15–150)
GLUCOSE BLDC GLUCOMTR-MCNC: 94 MG/DL — SIGNIFICANT CHANGE UP (ref 70–99)
GLUCOSE BLDC GLUCOMTR-MCNC: 94 MG/DL — SIGNIFICANT CHANGE UP (ref 70–99)
GLUCOSE SERPL-MCNC: 90 MG/DL — SIGNIFICANT CHANGE UP (ref 70–99)
HCT VFR BLD CALC: 34.4 % — LOW (ref 34.5–45)
HGB BLD-MCNC: 11.1 G/DL — LOW (ref 11.5–15.5)
IMM GRANULOCYTES NFR BLD AUTO: 1 % — SIGNIFICANT CHANGE UP (ref 0–1.5)
LYMPHOCYTES # BLD AUTO: 0.99 K/UL — LOW (ref 1–3.3)
LYMPHOCYTES # BLD AUTO: 17.1 % — SIGNIFICANT CHANGE UP (ref 13–44)
MAGNESIUM SERPL-MCNC: 2.2 MG/DL — SIGNIFICANT CHANGE UP (ref 1.6–2.6)
MCHC RBC-ENTMCNC: 31.3 PG — SIGNIFICANT CHANGE UP (ref 27–34)
MCHC RBC-ENTMCNC: 32.3 GM/DL — SIGNIFICANT CHANGE UP (ref 32–36)
MCV RBC AUTO: 96.9 FL — SIGNIFICANT CHANGE UP (ref 80–100)
MONOCYTES # BLD AUTO: 0.68 K/UL — SIGNIFICANT CHANGE UP (ref 0–0.9)
MONOCYTES NFR BLD AUTO: 11.8 % — SIGNIFICANT CHANGE UP (ref 2–14)
NEUTROPHILS # BLD AUTO: 3.85 K/UL — SIGNIFICANT CHANGE UP (ref 1.8–7.4)
NEUTROPHILS NFR BLD AUTO: 66.7 % — SIGNIFICANT CHANGE UP (ref 43–77)
NRBC # BLD: 0 /100 WBCS — SIGNIFICANT CHANGE UP (ref 0–0)
PHOSPHATE SERPL-MCNC: 3 MG/DL — SIGNIFICANT CHANGE UP (ref 2.5–4.5)
PLATELET # BLD AUTO: 474 K/UL — HIGH (ref 150–400)
POTASSIUM SERPL-MCNC: 4 MMOL/L — SIGNIFICANT CHANGE UP (ref 3.5–5.3)
POTASSIUM SERPL-SCNC: 4 MMOL/L — SIGNIFICANT CHANGE UP (ref 3.5–5.3)
PROT SERPL-MCNC: 6.1 G/DL — SIGNIFICANT CHANGE UP (ref 6–8.3)
RBC # BLD: 3.55 M/UL — LOW (ref 3.8–5.2)
RBC # FLD: 13.8 % — SIGNIFICANT CHANGE UP (ref 10.3–14.5)
SODIUM SERPL-SCNC: 141 MMOL/L — SIGNIFICANT CHANGE UP (ref 135–145)
WBC # BLD: 5.78 K/UL — SIGNIFICANT CHANGE UP (ref 3.8–10.5)
WBC # FLD AUTO: 5.78 K/UL — SIGNIFICANT CHANGE UP (ref 3.8–10.5)

## 2020-04-14 PROCEDURE — 99231 SBSQ HOSP IP/OBS SF/LOW 25: CPT | Mod: GC

## 2020-04-14 RX ORDER — ACETAMINOPHEN 500 MG
650 TABLET ORAL ONCE
Refills: 0 | Status: COMPLETED | OUTPATIENT
Start: 2020-04-14 | End: 2020-04-14

## 2020-04-14 RX ORDER — FAMOTIDINE 10 MG/ML
20 INJECTION INTRAVENOUS
Refills: 0 | Status: DISCONTINUED | OUTPATIENT
Start: 2020-04-15 | End: 2020-04-16

## 2020-04-14 RX ADMIN — MONTELUKAST 10 MILLIGRAM(S): 4 TABLET, CHEWABLE ORAL at 13:52

## 2020-04-14 RX ADMIN — FAMOTIDINE 20 MILLIGRAM(S): 10 INJECTION INTRAVENOUS at 13:51

## 2020-04-14 RX ADMIN — BUDESONIDE AND FORMOTEROL FUMARATE DIHYDRATE 2 PUFF(S): 160; 4.5 AEROSOL RESPIRATORY (INHALATION) at 22:14

## 2020-04-14 RX ADMIN — Medication 400 MILLIGRAM(S): at 13:52

## 2020-04-14 RX ADMIN — Medication 650 MILLIGRAM(S): at 10:06

## 2020-04-14 RX ADMIN — CEFTRIAXONE 100 MILLIGRAM(S): 500 INJECTION, POWDER, FOR SOLUTION INTRAMUSCULAR; INTRAVENOUS at 11:05

## 2020-04-14 RX ADMIN — ENOXAPARIN SODIUM 40 MILLIGRAM(S): 100 INJECTION SUBCUTANEOUS at 11:06

## 2020-04-14 RX ADMIN — AZITHROMYCIN 250 MILLIGRAM(S): 500 TABLET, FILM COATED ORAL at 10:08

## 2020-04-14 RX ADMIN — BUDESONIDE AND FORMOTEROL FUMARATE DIHYDRATE 2 PUFF(S): 160; 4.5 AEROSOL RESPIRATORY (INHALATION) at 10:18

## 2020-04-14 NOTE — PROGRESS NOTE ADULT - ASSESSMENT
· Assessment	  79 year old with PMHX of asthma who presents for a week of fever, cough, with worsening symptoms and NEUMANN admitted for acute hypoxic respiratory failure 2/2 possible CAP vs r/o COVID.   Pt currently 96% on RA, desaturates on ambulation.  Pt still covid negative and swabbed for RSV, influenza A and B.  Pt has 3 negative Covid swabs.  Pt is currently 95% on RA and ambulates at 95% RA.  Discharge was postponed pending auth for HHA.     Problem/Plan - 1:  ·  Problem: Pneumonia due to infectious organism, unspecified laterality, unspecified part of lung.  Plan: Patient presents with 7 days of cough, and headache, with some dyspnea on exertion. On admission, chest x-ray with multifocal opacities with patchy infiltrates with concerns for PNA. Was not meeting sepsis criteria, but was noted to be hypoxia to 90-91% on RA. Elevated inflammatory markers including ferritin, d-dimer, CRP, and lymphopenia. Given hypoxia, r/o COVID. Given elevated pro-calcitonin to 26, can suspect component of CAP.   - f/u COVID-19 PCR  - C/w Plaquenil started on 4/10   - C/w with Azithromycin started on 4/11   - Continue to trend daily LFTs, ferritin, LDH, CRP, procalcitonin  - Tylenol PRN for fever, avoid NSAIDs  - Supplemental O2 via NC, NRB, wean as tolerated  -repeat PCR ordered (4/12), negative again.      Problem/Plan - 2:  ·  Problem: Respiratory failure with hypoxia, unspecified chronicity.  Plan: Patient presented with hypoxia noted on exam to 90% on RA, with some dyspnea on exertion. Placed on NC with improvement in saturation to 95-96%   - Supplemental Oxygen as needed   - Wean NC as tolerated  -Pt currently 95% on RA, 93% on RA with ambulation.      Problem/Plan - 3:  (Data referenced from "Progress Note Adult" 12-Apr-2020 12:15)  ·  Problem: Asthma, unspecified asthma severity, unspecified whether complicated, unspecified whether persistent.  Plan: Patient has a history of asthma, not in acute exacerbation right now.   - C/w with Albuterol PRN   - C/w with Symbicort 2 puffs daily   - c/w with Montelukast 10 mg daily.      Problem/Plan - 4:  (Data referenced from "Progress Note Adult" 12-Apr-2020 12:15)  ·  Problem: Nutrition, metabolism, and development symptoms.  Plan: F: No fluids   E: Repelte K<4.0 and MG<2.0   N: Regular Diet   DVT: Lovenox. Transition to therapeutic eliquis at discharge for continued elevated d dimer.

## 2020-04-14 NOTE — DISCHARGE NOTE NURSING/CASE MANAGEMENT/SOCIAL WORK - PATIENT PORTAL LINK FT
You can access the FollowMyHealth Patient Portal offered by [facility] by registering at the following website: [email or web address]. By joining [facility]’s FollowMyHealth portal, you will also be able to view your health information using other applications (apps) compatible with our system.

## 2020-04-14 NOTE — PROGRESS NOTE ADULT - SUBJECTIVE AND OBJECTIVE BOX
Medicine Progress Note    Pt seen at bedside in am.  Pt only complaining of neuropathy to feet. Tylenol given.  No calf pain.  Pt denies any chest pain, SOB, cough, fever, chills, headaches, myalgias, abdominal pain, or n/v/d.        SUBJECTIVE / OVERNIGHT EVENTS:    ADDITIONAL REVIEW OF SYSTEMS:    MEDICATIONS  (STANDING):  azithromycin   Tablet 250 milliGRAM(s) Oral every 24 hours  budesonide 160 MICROgram(s)/formoterol 4.5 MICROgram(s) Inhaler 2 Puff(s) Inhalation two times a day  cefTRIAXone   IVPB 1000 milliGRAM(s) IV Intermittent every 24 hours  enoxaparin Injectable 40 milliGRAM(s) SubCutaneous every 24 hours  montelukast 10 milliGRAM(s) Oral daily    MEDICATIONS  (PRN):  acetaminophen   Tablet .. 650 milliGRAM(s) Oral every 6 hours PRN Temp greater or equal to 38.5C (101.3F)  ALBUTerol    90 MICROgram(s) HFA Inhaler 2 Puff(s) Inhalation every 6 hours PRN Shortness of Breath and/or Wheezing  guaiFENesin   Syrup  (Sugar-Free) 100 milliGRAM(s) Oral every 6 hours PRN Cough    CAPILLARY BLOOD GLUCOSE      POCT Blood Glucose.: 94 mg/dL (14 Apr 2020 16:58)  POCT Blood Glucose.: 94 mg/dL (14 Apr 2020 13:33)    I&O's Summary      PHYSICAL EXAM:  Vital Signs Last 24 Hrs  T(C): 36.6 (14 Apr 2020 09:02), Max: 37.2 (13 Apr 2020 18:18)  T(F): 97.9 (14 Apr 2020 09:02), Max: 98.9 (13 Apr 2020 18:18)  HR: 75 (14 Apr 2020 09:02) (75 - 90)  BP: 111/69 (14 Apr 2020 09:02) (111/69 - 125/71)  BP(mean): --  RR: 18 (14 Apr 2020 09:02) (18 - 19)  SpO2: 95% (14 Apr 2020 09:02) (93% - 95%)  CONSTITUTIONAL: NAD, well-developed, well-groomed  ENMT: Moist oral mucosa, no pharyngeal injection or exudates; normal dentition  RESPIRATORY: Normal respiratory effort; lungs are clear to auscultation bilaterally  CARDIOVASCULAR: Regular rate and rhythm, normal S1 and S2, no murmur/rub/gallop; No lower extremity edema; Peripheral pulses are 2+ bilaterally  ABDOMEN: Nontender to palpation, normoactive bowel sounds, no rebound/guarding; No hepatosplenomegaly  PSYCH: A+O to person, place, and time; affect appropriate  NEUROLOGY: CN 2-12 are intact and symmetric; no gross sensory deficits   SKIN: No rashes; no palpable lesions    LABS:                        11.1   5.78  )-----------( 474      ( 14 Apr 2020 08:15 )             34.4     04-14    141  |  106  |  9   ----------------------------<  90  4.0   |  25  |  0.56    Ca    8.6      14 Apr 2020 08:15  Phos  3.0     04-14  Mg     2.2     04-14    TPro  6.1  /  Alb  2.9<L>  /  TBili  0.3  /  DBili  x   /  AST  19  /  ALT  17  /  AlkPhos  55  04-14              COVID-19 PCR: NotDetec (12 Apr 2020 11:24)      RADIOLOGY & ADDITIONAL TESTS:  Imaging from Last 24 Hours:    Electrocardiogram/QTc Interval:    COORDINATION OF CARE:  Care Discussed with Consultants/Other Providers:

## 2020-04-15 LAB
ALBUMIN SERPL ELPH-MCNC: 2.9 G/DL — LOW (ref 3.3–5)
ALP SERPL-CCNC: 55 U/L — SIGNIFICANT CHANGE UP (ref 40–120)
ALT FLD-CCNC: 20 U/L — SIGNIFICANT CHANGE UP (ref 10–45)
ANION GAP SERPL CALC-SCNC: 9 MMOL/L — SIGNIFICANT CHANGE UP (ref 5–17)
AST SERPL-CCNC: 19 U/L — SIGNIFICANT CHANGE UP (ref 10–40)
BASOPHILS # BLD AUTO: 0.02 K/UL — SIGNIFICANT CHANGE UP (ref 0–0.2)
BASOPHILS NFR BLD AUTO: 0.3 % — SIGNIFICANT CHANGE UP (ref 0–2)
BILIRUB SERPL-MCNC: 0.2 MG/DL — SIGNIFICANT CHANGE UP (ref 0.2–1.2)
BUN SERPL-MCNC: 12 MG/DL — SIGNIFICANT CHANGE UP (ref 7–23)
CALCIUM SERPL-MCNC: 8.8 MG/DL — SIGNIFICANT CHANGE UP (ref 8.4–10.5)
CHLORIDE SERPL-SCNC: 105 MMOL/L — SIGNIFICANT CHANGE UP (ref 96–108)
CO2 SERPL-SCNC: 28 MMOL/L — SIGNIFICANT CHANGE UP (ref 22–31)
CREAT SERPL-MCNC: 0.66 MG/DL — SIGNIFICANT CHANGE UP (ref 0.5–1.3)
CRP SERPL-MCNC: 2.22 MG/DL — HIGH (ref 0–0.4)
D DIMER BLD IA.RAPID-MCNC: 2634 NG/ML DDU — HIGH
EOSINOPHIL # BLD AUTO: 0.1 K/UL — SIGNIFICANT CHANGE UP (ref 0–0.5)
EOSINOPHIL NFR BLD AUTO: 1.7 % — SIGNIFICANT CHANGE UP (ref 0–6)
FERRITIN SERPL-MCNC: 257 NG/ML — HIGH (ref 15–150)
GLUCOSE SERPL-MCNC: 98 MG/DL — SIGNIFICANT CHANGE UP (ref 70–99)
HCT VFR BLD CALC: 34.3 % — LOW (ref 34.5–45)
HGB BLD-MCNC: 10.6 G/DL — LOW (ref 11.5–15.5)
IMM GRANULOCYTES NFR BLD AUTO: 1.2 % — SIGNIFICANT CHANGE UP (ref 0–1.5)
LYMPHOCYTES # BLD AUTO: 1.06 K/UL — SIGNIFICANT CHANGE UP (ref 1–3.3)
LYMPHOCYTES # BLD AUTO: 17.9 % — SIGNIFICANT CHANGE UP (ref 13–44)
MAGNESIUM SERPL-MCNC: 2.1 MG/DL — SIGNIFICANT CHANGE UP (ref 1.6–2.6)
MCHC RBC-ENTMCNC: 29.9 PG — SIGNIFICANT CHANGE UP (ref 27–34)
MCHC RBC-ENTMCNC: 30.9 GM/DL — LOW (ref 32–36)
MCV RBC AUTO: 96.9 FL — SIGNIFICANT CHANGE UP (ref 80–100)
MONOCYTES # BLD AUTO: 0.7 K/UL — SIGNIFICANT CHANGE UP (ref 0–0.9)
MONOCYTES NFR BLD AUTO: 11.8 % — SIGNIFICANT CHANGE UP (ref 2–14)
NEUTROPHILS # BLD AUTO: 3.98 K/UL — SIGNIFICANT CHANGE UP (ref 1.8–7.4)
NEUTROPHILS NFR BLD AUTO: 67.1 % — SIGNIFICANT CHANGE UP (ref 43–77)
NRBC # BLD: 0 /100 WBCS — SIGNIFICANT CHANGE UP (ref 0–0)
PHOSPHATE SERPL-MCNC: 3.2 MG/DL — SIGNIFICANT CHANGE UP (ref 2.5–4.5)
PLATELET # BLD AUTO: 496 K/UL — HIGH (ref 150–400)
POTASSIUM SERPL-MCNC: 4.6 MMOL/L — SIGNIFICANT CHANGE UP (ref 3.5–5.3)
POTASSIUM SERPL-SCNC: 4.6 MMOL/L — SIGNIFICANT CHANGE UP (ref 3.5–5.3)
PROT SERPL-MCNC: 6.2 G/DL — SIGNIFICANT CHANGE UP (ref 6–8.3)
RBC # BLD: 3.54 M/UL — LOW (ref 3.8–5.2)
RBC # FLD: 13.7 % — SIGNIFICANT CHANGE UP (ref 10.3–14.5)
SODIUM SERPL-SCNC: 142 MMOL/L — SIGNIFICANT CHANGE UP (ref 135–145)
WBC # BLD: 5.93 K/UL — SIGNIFICANT CHANGE UP (ref 3.8–10.5)
WBC # FLD AUTO: 5.93 K/UL — SIGNIFICANT CHANGE UP (ref 3.8–10.5)

## 2020-04-15 PROCEDURE — 99232 SBSQ HOSP IP/OBS MODERATE 35: CPT

## 2020-04-15 RX ORDER — CEFPODOXIME PROXETIL 100 MG
1 TABLET ORAL
Qty: 2 | Refills: 0
Start: 2020-04-15 | End: 2020-04-15

## 2020-04-15 RX ORDER — AZITHROMYCIN 500 MG/1
1 TABLET, FILM COATED ORAL
Qty: 1 | Refills: 0
Start: 2020-04-15 | End: 2020-04-15

## 2020-04-15 RX ORDER — ACETAMINOPHEN 500 MG
2 TABLET ORAL
Qty: 0 | Refills: 0 | DISCHARGE
Start: 2020-04-15

## 2020-04-15 RX ORDER — APIXABAN 2.5 MG/1
2 TABLET, FILM COATED ORAL
Qty: 28 | Refills: 0
Start: 2020-04-15 | End: 2020-04-21

## 2020-04-15 RX ORDER — HYDROXYCHLOROQUINE SULFATE 200 MG
2 TABLET ORAL
Qty: 2 | Refills: 0
Start: 2020-04-15 | End: 2020-04-15

## 2020-04-15 RX ORDER — AZITHROMYCIN 500 MG/1
250 TABLET, FILM COATED ORAL ONCE
Refills: 0 | Status: COMPLETED | OUTPATIENT
Start: 2020-04-15 | End: 2020-04-15

## 2020-04-15 RX ORDER — ACETAMINOPHEN 500 MG
650 TABLET ORAL EVERY 6 HOURS
Refills: 0 | Status: DISCONTINUED | OUTPATIENT
Start: 2020-04-15 | End: 2020-04-16

## 2020-04-15 RX ORDER — RIVAROXABAN 15 MG-20MG
1 KIT ORAL
Qty: 39 | Refills: 0
Start: 2020-04-15 | End: 2020-05-23

## 2020-04-15 RX ADMIN — BUDESONIDE AND FORMOTEROL FUMARATE DIHYDRATE 2 PUFF(S): 160; 4.5 AEROSOL RESPIRATORY (INHALATION) at 09:48

## 2020-04-15 RX ADMIN — FAMOTIDINE 20 MILLIGRAM(S): 10 INJECTION INTRAVENOUS at 17:34

## 2020-04-15 RX ADMIN — AZITHROMYCIN 250 MILLIGRAM(S): 500 TABLET, FILM COATED ORAL at 11:00

## 2020-04-15 RX ADMIN — MONTELUKAST 10 MILLIGRAM(S): 4 TABLET, CHEWABLE ORAL at 11:00

## 2020-04-15 RX ADMIN — BUDESONIDE AND FORMOTEROL FUMARATE DIHYDRATE 2 PUFF(S): 160; 4.5 AEROSOL RESPIRATORY (INHALATION) at 21:58

## 2020-04-15 RX ADMIN — ENOXAPARIN SODIUM 40 MILLIGRAM(S): 100 INJECTION SUBCUTANEOUS at 09:20

## 2020-04-15 RX ADMIN — CEFTRIAXONE 100 MILLIGRAM(S): 500 INJECTION, POWDER, FOR SOLUTION INTRAMUSCULAR; INTRAVENOUS at 09:21

## 2020-04-15 RX ADMIN — FAMOTIDINE 20 MILLIGRAM(S): 10 INJECTION INTRAVENOUS at 05:38

## 2020-04-15 NOTE — PROGRESS NOTE ADULT - SUBJECTIVE AND OBJECTIVE BOX
Medicine Progress Note    Pt seen at bedside in am. Interviewed with . Still complaining of neuropathy in feet. Has tried gabapentin and lyrica in the past, but Tylenol works best per her. Medically ready for discharge.    SUBJECTIVE / OVERNIGHT EVENTS:    ADDITIONAL REVIEW OF SYSTEMS:    MEDICATIONS  (STANDING):  budesonide 160 MICROgram(s)/formoterol 4.5 MICROgram(s) Inhaler 2 Puff(s) Inhalation two times a day  enoxaparin Injectable 40 milliGRAM(s) SubCutaneous every 24 hours  famotidine    Tablet 20 milliGRAM(s) Oral two times a day  montelukast 10 milliGRAM(s) Oral daily    MEDICATIONS  (PRN):  acetaminophen   Tablet .. 650 milliGRAM(s) Oral every 6 hours PRN Temp greater or equal to 38.5C (101.3F)  ALBUTerol    90 MICROgram(s) HFA Inhaler 2 Puff(s) Inhalation every 6 hours PRN Shortness of Breath and/or Wheezing  guaiFENesin   Syrup  (Sugar-Free) 100 milliGRAM(s) Oral every 6 hours PRN Cough    CAPILLARY BLOOD GLUCOSE      POCT Blood Glucose.: 94 mg/dL (14 Apr 2020 16:58)    I&O's Summary      PHYSICAL EXAM:  Vital Signs Last 24 Hrs  T(C): 36.8 (15 Apr 2020 16:15), Max: 37.1 (14 Apr 2020 21:38)  T(F): 98.3 (15 Apr 2020 16:15), Max: 98.7 (14 Apr 2020 21:38)  HR: 81 (15 Apr 2020 16:15) (80 - 82)  BP: 120/66 (15 Apr 2020 16:15) (119/61 - 127/66)  BP(mean): --  RR: 16 (15 Apr 2020 16:15) (16 - 18)  SpO2: 95% (15 Apr 2020 16:15) (93% - 100%)  CONSTITUTIONAL: NAD, well-developed, well-groomed  ENMT: Moist oral mucosa, no pharyngeal injection or exudates; normal dentition  RESPIRATORY: Normal respiratory effort; lungs are clear to auscultation bilaterally  CARDIOVASCULAR: Regular rate and rhythm, normal S1 and S2, no murmur/rub/gallop; No lower extremity edema; Peripheral pulses are 2+ bilaterally  ABDOMEN: Nontender to palpation, normoactive bowel sounds, no rebound/guarding; No hepatosplenomegaly  PSYCH: A+O to person, place, and time; affect appropriate  NEUROLOGY: CN 2-12 are intact and symmetric; no gross sensory deficits   SKIN: No rashes; no palpable lesions    LABS:                        10.6   5.93  )-----------( 496      ( 15 Apr 2020 06:33 )             34.3     04-15    142  |  105  |  12  ----------------------------<  98  4.6   |  28  |  0.66    Ca    8.8      15 Apr 2020 06:33  Phos  3.2     04-15  Mg     2.1     04-15    TPro  6.2  /  Alb  2.9<L>  /  TBili  0.2  /  DBili  x   /  AST  19  /  ALT  20  /  AlkPhos  55  04-15              COVID-19 PCR: NotDetec (12 Apr 2020 11:24)      RADIOLOGY & ADDITIONAL TESTS:  Imaging from Last 24 Hours:    Electrocardiogram/QTc Interval:    COORDINATION OF CARE:  Care Discussed with Consultants/Other Providers:

## 2020-04-15 NOTE — PROGRESS NOTE ADULT - ASSESSMENT
Assessment	  79 year old with PMHX of asthma who presents for a week of fever, cough, with worsening symptoms and NEUMANN admitted for acute hypoxic respiratory failure 2/2 possible CAP vs r/o COVID.  Pt still covid negative and swabbed for RSV, influenza A and B.  Pt has 3 negative Covid swabs.  Pt is currently 95% on RA and ambulates at 95% RA.  Discharge was postponed pending auth for HHA.     Problem/Plan - 1:  ·  Problem: Pneumonia due to infectious organism, unspecified laterality, unspecified part of lung.  Plan: Patient presents with 7 days of cough, and headache, with some dyspnea on exertion. On admission, chest x-ray with multifocal opacities with patchy infiltrates with concerns for PNA. Was not meeting sepsis criteria, but was noted to be hypoxia to 90-91% on RA. Elevated inflammatory markers including ferritin, d-dimer, CRP, and lymphopenia. Given hypoxia, r/o COVID. Given elevated pro-calcitonin to 26, can suspect component of CAP.   - f/u COVID-19 PCR  - C/w Plaquenil started on 4/10 - COMPLETE  - C/w with Azithromycin started on 4/11 - COMPLETE  - Continue to trend daily LFTs, ferritin, LDH, CRP, procalcitonin  - Tylenol PRN for fever, avoid NSAIDs  - Supplemental O2 via NC if needed  -repeat PCR ordered (4/12), negative again.      Problem/Plan - 2:  ·  Problem: Respiratory failure with hypoxia, unspecified chronicity.  Plan: Patient presented with hypoxia noted on exam to 90% on RA, with some dyspnea on exertion. Placed on NC with improvement in saturation to 95-96%   - Supplemental Oxygen as needed   -Pt currently 94% RA      Problem/Plan - 3:  (Data referenced from "Progress Note Adult" 12-Apr-2020 12:15)  ·  Problem: Asthma, unspecified asthma severity, unspecified whether complicated, unspecified whether persistent.  Plan: Patient has a history of asthma, not in acute exacerbation right now.   - C/w with Albuterol PRN   - C/w with Symbicort 2 puffs daily   - c/w with Montelukast 10 mg daily.      Problem/Plan - 4:  (Data referenced from "Progress Note Adult" 12-Apr-2020 12:15)  ·  Problem: Nutrition, metabolism, and development symptoms.  Plan: F: No fluids   E: Repelte K<4.0 and MG<2.0   N: Regular Diet   DVT: Lovenox. Transition to therapeutic eliquis at discharge for continued elevated d dimer. Assessment	  79 year old with PMHX of asthma who presents for a week of fever, cough, with worsening symptoms and NEUMANN admitted for acute hypoxic respiratory failure 2/2 possible CAP vs r/o COVID.  Pt still covid negative and swabbed for RSV, influenza A and B.  Pt has 3 negative Covid swabs.  Pt is currently 95% on RA and ambulates at 95% RA.  Discharge was postponed pending auth for HHA.     Problem/Plan - 1:  ·  Problem: Pneumonia due to infectious organism, unspecified laterality, unspecified part of lung.  Plan: Patient presents with 7 days of cough, and headache, with some dyspnea on exertion. On admission, chest x-ray with multifocal opacities with patchy infiltrates with concerns CAP (elevated procalcitonin) as well as COVID + PNA.   - C/w Plaquenil started on 4/10 - COMPLETE  - C/w with Azithromycin started on 4/11 - COMPLETE  - Continue to trend daily LFTs, ferritin, LDH, CRP, procalcitonin  - Tylenol PRN for fever, avoid NSAIDs  - Supplemental O2 via NC if needed  -repeat COVID PCR testing negative x2 after initial positive test.      Problem/Plan - 2:  ·  Problem: Respiratory failure with hypoxia, unspecified chronicity.  Plan: Patient presented with hypoxia noted on exam to 90% on RA, with some dyspnea on exertion. Placed on NC with improvement in saturation to 95-96%   - Supplemental Oxygen as needed   -Pt currently 94% RA      Problem/Plan - 3:  (Data referenced from "Progress Note Adult" 12-Apr-2020 12:15)  ·  Problem: Asthma, unspecified asthma severity, unspecified whether complicated, unspecified whether persistent.  Plan: Patient has a history of asthma, not in acute exacerbation right now.   - C/w with Albuterol PRN   - C/w with Symbicort 2 puffs daily   - c/w with Montelukast 10 mg daily.      Problem/Plan - 4:  (Data referenced from "Progress Note Adult" 12-Apr-2020 12:15)  ·  Problem: Nutrition, metabolism, and development symptoms.  Plan: F: No fluids   E: Replete K<4.0 and MG<2.0   N: Regular Diet   DVT: Lovenox. Transition to therapeutic eliquis at discharge for continued elevated d dimer.

## 2020-04-15 NOTE — PROGRESS NOTE ADULT - ATTENDING COMMENTS
reviewed chart, CTA and LE dopplers negative, saturating well on RA, no indication for full dose anticoagulation based on current evidence. Will d/c home on 31 days of rivaroxaban 10mg daily for covid-related thromboprophylaxis given her high D dimers (>2000)
Seen on 4/12 at bedside  Agree with above

## 2020-04-16 VITALS
OXYGEN SATURATION: 93 % | SYSTOLIC BLOOD PRESSURE: 102 MMHG | TEMPERATURE: 98 F | DIASTOLIC BLOOD PRESSURE: 57 MMHG | HEART RATE: 74 BPM | RESPIRATION RATE: 18 BRPM

## 2020-04-16 LAB
ALBUMIN SERPL ELPH-MCNC: 2.9 G/DL — LOW (ref 3.3–5)
ALP SERPL-CCNC: 59 U/L — SIGNIFICANT CHANGE UP (ref 40–120)
ALT FLD-CCNC: 33 U/L — SIGNIFICANT CHANGE UP (ref 10–45)
ANION GAP SERPL CALC-SCNC: 8 MMOL/L — SIGNIFICANT CHANGE UP (ref 5–17)
AST SERPL-CCNC: 23 U/L — SIGNIFICANT CHANGE UP (ref 10–40)
BASOPHILS # BLD AUTO: 0.04 K/UL — SIGNIFICANT CHANGE UP (ref 0–0.2)
BASOPHILS NFR BLD AUTO: 0.6 % — SIGNIFICANT CHANGE UP (ref 0–2)
BILIRUB SERPL-MCNC: 0.3 MG/DL — SIGNIFICANT CHANGE UP (ref 0.2–1.2)
BUN SERPL-MCNC: 13 MG/DL — SIGNIFICANT CHANGE UP (ref 7–23)
CALCIUM SERPL-MCNC: 8.9 MG/DL — SIGNIFICANT CHANGE UP (ref 8.4–10.5)
CHLORIDE SERPL-SCNC: 103 MMOL/L — SIGNIFICANT CHANGE UP (ref 96–108)
CO2 SERPL-SCNC: 26 MMOL/L — SIGNIFICANT CHANGE UP (ref 22–31)
CREAT SERPL-MCNC: 0.59 MG/DL — SIGNIFICANT CHANGE UP (ref 0.5–1.3)
CRP SERPL-MCNC: 1.31 MG/DL — HIGH (ref 0–0.4)
D DIMER BLD IA.RAPID-MCNC: 2257 NG/ML DDU — HIGH
EOSINOPHIL # BLD AUTO: 0.17 K/UL — SIGNIFICANT CHANGE UP (ref 0–0.5)
EOSINOPHIL NFR BLD AUTO: 2.6 % — SIGNIFICANT CHANGE UP (ref 0–6)
FERRITIN SERPL-MCNC: 256 NG/ML — HIGH (ref 15–150)
GLUCOSE SERPL-MCNC: 92 MG/DL — SIGNIFICANT CHANGE UP (ref 70–99)
HCT VFR BLD CALC: 34.2 % — LOW (ref 34.5–45)
HGB BLD-MCNC: 10.9 G/DL — LOW (ref 11.5–15.5)
IMM GRANULOCYTES NFR BLD AUTO: 1.1 % — SIGNIFICANT CHANGE UP (ref 0–1.5)
LYMPHOCYTES # BLD AUTO: 1.07 K/UL — SIGNIFICANT CHANGE UP (ref 1–3.3)
LYMPHOCYTES # BLD AUTO: 16.1 % — SIGNIFICANT CHANGE UP (ref 13–44)
MAGNESIUM SERPL-MCNC: 2.2 MG/DL — SIGNIFICANT CHANGE UP (ref 1.6–2.6)
MCHC RBC-ENTMCNC: 30.5 PG — SIGNIFICANT CHANGE UP (ref 27–34)
MCHC RBC-ENTMCNC: 31.9 GM/DL — LOW (ref 32–36)
MCV RBC AUTO: 95.8 FL — SIGNIFICANT CHANGE UP (ref 80–100)
MONOCYTES # BLD AUTO: 0.81 K/UL — SIGNIFICANT CHANGE UP (ref 0–0.9)
MONOCYTES NFR BLD AUTO: 12.2 % — SIGNIFICANT CHANGE UP (ref 2–14)
NEUTROPHILS # BLD AUTO: 4.48 K/UL — SIGNIFICANT CHANGE UP (ref 1.8–7.4)
NEUTROPHILS NFR BLD AUTO: 67.4 % — SIGNIFICANT CHANGE UP (ref 43–77)
NRBC # BLD: 0 /100 WBCS — SIGNIFICANT CHANGE UP (ref 0–0)
PHOSPHATE SERPL-MCNC: 3.3 MG/DL — SIGNIFICANT CHANGE UP (ref 2.5–4.5)
PLATELET # BLD AUTO: 494 K/UL — HIGH (ref 150–400)
POTASSIUM SERPL-MCNC: 4.8 MMOL/L — SIGNIFICANT CHANGE UP (ref 3.5–5.3)
POTASSIUM SERPL-SCNC: 4.8 MMOL/L — SIGNIFICANT CHANGE UP (ref 3.5–5.3)
PROT SERPL-MCNC: 6.4 G/DL — SIGNIFICANT CHANGE UP (ref 6–8.3)
RBC # BLD: 3.57 M/UL — LOW (ref 3.8–5.2)
RBC # FLD: 13.8 % — SIGNIFICANT CHANGE UP (ref 10.3–14.5)
SODIUM SERPL-SCNC: 137 MMOL/L — SIGNIFICANT CHANGE UP (ref 135–145)
WBC # BLD: 6.64 K/UL — SIGNIFICANT CHANGE UP (ref 3.8–10.5)
WBC # FLD AUTO: 6.64 K/UL — SIGNIFICANT CHANGE UP (ref 3.8–10.5)

## 2020-04-16 PROCEDURE — 85025 COMPLETE CBC W/AUTO DIFF WBC: CPT

## 2020-04-16 PROCEDURE — 36415 COLL VENOUS BLD VENIPUNCTURE: CPT

## 2020-04-16 PROCEDURE — 80053 COMPREHEN METABOLIC PANEL: CPT

## 2020-04-16 PROCEDURE — 71275 CT ANGIOGRAPHY CHEST: CPT

## 2020-04-16 PROCEDURE — 84145 PROCALCITONIN (PCT): CPT

## 2020-04-16 PROCEDURE — 86140 C-REACTIVE PROTEIN: CPT

## 2020-04-16 PROCEDURE — 84100 ASSAY OF PHOSPHORUS: CPT

## 2020-04-16 PROCEDURE — 93970 EXTREMITY STUDY: CPT

## 2020-04-16 PROCEDURE — 87631 RESP VIRUS 3-5 TARGETS: CPT

## 2020-04-16 PROCEDURE — 82728 ASSAY OF FERRITIN: CPT

## 2020-04-16 PROCEDURE — 87635 SARS-COV-2 COVID-19 AMP PRB: CPT

## 2020-04-16 PROCEDURE — 83735 ASSAY OF MAGNESIUM: CPT

## 2020-04-16 PROCEDURE — 85652 RBC SED RATE AUTOMATED: CPT

## 2020-04-16 PROCEDURE — 85379 FIBRIN DEGRADATION QUANT: CPT

## 2020-04-16 PROCEDURE — 84484 ASSAY OF TROPONIN QUANT: CPT

## 2020-04-16 PROCEDURE — 93005 ELECTROCARDIOGRAM TRACING: CPT

## 2020-04-16 PROCEDURE — 94640 AIRWAY INHALATION TREATMENT: CPT

## 2020-04-16 PROCEDURE — 86480 TB TEST CELL IMMUN MEASURE: CPT

## 2020-04-16 PROCEDURE — 82962 GLUCOSE BLOOD TEST: CPT

## 2020-04-16 PROCEDURE — 99238 HOSP IP/OBS DSCHRG MGMT 30/<: CPT

## 2020-04-16 PROCEDURE — 99285 EMERGENCY DEPT VISIT HI MDM: CPT

## 2020-04-16 RX ADMIN — BUDESONIDE AND FORMOTEROL FUMARATE DIHYDRATE 2 PUFF(S): 160; 4.5 AEROSOL RESPIRATORY (INHALATION) at 08:25

## 2020-04-16 RX ADMIN — ENOXAPARIN SODIUM 40 MILLIGRAM(S): 100 INJECTION SUBCUTANEOUS at 09:31

## 2020-04-16 RX ADMIN — FAMOTIDINE 20 MILLIGRAM(S): 10 INJECTION INTRAVENOUS at 05:34

## 2020-04-16 RX ADMIN — MONTELUKAST 10 MILLIGRAM(S): 4 TABLET, CHEWABLE ORAL at 09:31

## 2020-04-16 RX ADMIN — Medication 100 MILLIGRAM(S): at 09:48

## 2020-04-16 RX ADMIN — Medication 650 MILLIGRAM(S): at 09:47

## 2020-04-22 DIAGNOSIS — J12.89 OTHER VIRAL PNEUMONIA: ICD-10-CM

## 2020-04-22 DIAGNOSIS — E03.9 HYPOTHYROIDISM, UNSPECIFIED: ICD-10-CM

## 2020-04-22 DIAGNOSIS — J96.01 ACUTE RESPIRATORY FAILURE WITH HYPOXIA: ICD-10-CM

## 2020-04-22 DIAGNOSIS — U07.1 COVID-19: ICD-10-CM

## 2020-04-22 DIAGNOSIS — G62.9 POLYNEUROPATHY, UNSPECIFIED: ICD-10-CM

## 2020-04-22 DIAGNOSIS — J45.909 UNSPECIFIED ASTHMA, UNCOMPLICATED: ICD-10-CM

## 2020-04-22 DIAGNOSIS — D72.810 LYMPHOCYTOPENIA: ICD-10-CM

## 2020-04-22 DIAGNOSIS — R09.02 HYPOXEMIA: ICD-10-CM

## 2020-04-22 RX ORDER — APIXABAN 2.5 MG/1
1 TABLET, FILM COATED ORAL
Qty: 120 | Refills: 0
Start: 2020-04-22 | End: 2020-06-20

## 2020-04-22 RX ORDER — APIXABAN 2.5 MG/1
2 TABLET, FILM COATED ORAL
Qty: 75 | Refills: 0
Start: 2020-04-22 | End: 2020-05-21

## 2021-04-09 NOTE — ED PROVIDER NOTE - NS ED MD TWO NIGHTS YN
Pt. non english speaking and deferring use of  phone however, Pt. is alert and able/willing to participate fully in PT services. Yes

## 2022-11-23 NOTE — H&P ADULT - BIRTH SEX
Female Abbe Flap (Lower To Upper Lip) Text: The defect of the upper lip was assessed and measured.  Given the location and size of the defect, an Abbe flap was deemed most appropriate.  Using a sterile surgical marker, an appropriate Abbe flap was measured and drawn on the lower lip. Local anesthesia was then infiltrated. A scalpel was then used to incise the upper lip through and through the skin, vermilion, muscle and mucosa, leaving the flap pedicled on the opposite side.  The flap was then rotated and transferred to the lower lip defect.  The flap was then sutured into place with a three layer technique, closing the orbicularis oris muscle layer with subcutaneous buried sutures, followed by a mucosal layer and an epidermal layer.

## 2023-04-21 NOTE — ED PROVIDER NOTE - CPE EDP GASTRO NORM
normal... Detail Level: Detailed General Sunscreen Counseling: I recommended a broad spectrum sunscreen with a SPF of 30 or higher. I explained that SPF 30 sunscreens block approximately 97 percent of the sun's harmful rays. Sunscreens should be applied at least 15 minutes prior to expected sun exposure and then every 2 hours after that as long as sun exposure continues. If swimming or exercising sunscreen should be reapplied every 45 minutes to an hour after getting wet or sweating. One ounce, or the equivalent of a shot glass full of sunscreen, is adequate to protect the skin not covered by a bathing suit. I also recommended a lip balm with a sunscreen as well. Sun protective clothing can be used in lieu of sunscreen but must be worn the entire time you are exposed to the sun's rays. Products Recommended: Cerave

## 2023-06-26 NOTE — PROGRESS NOTE ADULT - PROBLEM SELECTOR PROBLEM 1
Pneumonia due to infectious organism, unspecified laterality, unspecified part of lung normal/well-groomed/no distress

## 2024-02-12 NOTE — ED ADULT NURSE NOTE - CHIEF COMPLAINT
Clinic Note    Reason for visit:  The primary encounter diagnosis was Other dysphagia. Diagnoses of Jackhammer esophagus, Gastroesophageal reflux disease, unspecified whether esophagitis present, Epigastric pain, and Right upper quadrant pain were also pertinent to this visit.    PCP: Genevieve Borges       HPI:  This is a 49 y.o. female who is here for a follow up. Patient with left breast cancer s/p lumpectomy who is currently undergoing radiation therapy that covered a large area of her chest but it is now localized to an area of the breast, with last radiation tx scheduled today. She was having radiation treatment in chest area and during that time she was having significant dysphagia and could hardly swallow solids/liquids. Since the radiation to her chest has been done 2/1/2024, her swallowing is improving and it is not as bad as it was. Still not back to complete normal. She increased her pantoprazole to 40 mg BID about a week ago and states she believes this is also helping her symptoms.  She was given magic mouthwash from Dr. Zuleta and she took it once and states it made her feel like she had a lump on back of her tongue so she will not take it again. Still has tenderness in epigastric area. She states she has had some pain there since having her GB removed years ago and then seems the pain is worse since she had hysterectomy/casey salpingo-oophorectomy in 12/2023. She has a RUQ incisional hernia at old GB incision and may have surgery for this but states she is holding off for now since she has been through so much. She went to [facility] ER in 1/2024 due to her swallowing issue and was told at that time her BP was very low.     Prior to radiation therapy, she would have dysphagia with solids about once per month. Was able to go to panto 40 once daily. She has not taken Mounjaro in the last 3 weeks. Denies NSAID use. No blood in stool.     She had germline genetic testing due to her young age breast cancer that  was negative with Dr. Girard.    [facility] reviewed.  Patient with Breast Ca- currently on radiation, hashimotos thyroiditis  ER visit due to N/V dysphagia 1/27/2024:  Neck US :heterogenous thyroid.  WBC 3.0L CBC, CMP, Lipase-NL     7/2021 with Dr. Rossi - transjugular liver biopsy w/portal venous pressure measurements. Portal venous pressure measurements: right atrium 7 mmHg, free hepatic 7 mmHg, wedge hepatic 10 mmHg.      7/2022 GES nl    Esophageal Manometry 7/2022: Esophagogastric outlet obstruction. Patient has elevated resting lower esophageal sphincter pressure but sufficient evidence of peristalsis. Also had 2 swallows with DIC greater than 8000 consistent with hypercontractile or jackhammer esophagus. May consider CCB.     EGD/Colonoscopy 6/21/2022: small HH, small amount of solid partially digested food in gastric lumen. GBx chr inact gitis w/o Hp, EBx nl. IH, otherwise normal colon. Repeat colonoscopy in 10 yr     Outside record reviewed:  [facility] - 2017 MRCP: wnl, fatty liver, no GB. EGD: gitis, reflux eitis, ABx wnl, EBx wnl. HCV FibroSURE F0 A0, ALT 22, GGT 29, Tb 0.2. HCV/HAV neg, HBV sAg/cIgM/sAb neg, AMA/ZEKE/chol/Fe/T/%/ferr/INR nl. 34/32    Review of Systems   Constitutional:  Negative for fatigue, fever and unexpected weight change.   HENT:  Positive for trouble swallowing. Negative for mouth sores, postnasal drip and sore throat.    Eyes:  Negative for pain, discharge and eye dryness.   Respiratory:  Negative for apnea, cough, choking, chest tightness, shortness of breath and wheezing.    Cardiovascular:  Negative for chest pain, palpitations and leg swelling.   Gastrointestinal:  Positive for abdominal pain. Negative for abdominal distention, anal bleeding, blood in stool, change in bowel habit, constipation, diarrhea, nausea, rectal pain, vomiting, reflux and fecal incontinence.   Genitourinary:  Negative for bladder incontinence, dysuria and hematuria.   Musculoskeletal:  Negative for  arthralgias, back pain and joint swelling.   Integumentary:  Negative for color change and rash.   Allergic/Immunologic: Negative for environmental allergies and food allergies.   Neurological:  Negative for seizures and headaches.   Hematological:  Negative for adenopathy. Does not bruise/bleed easily.        Past Medical History:   Diagnosis Date    Abnormal Pap smear of cervix     Anxiety     Breast cancer 09/08/2023    Depression     Diabetes mellitus     Fatty liver     Hashimoto's disease     Hypercontractile esophagus     Hypertension     Mild intermittent asthma, uncomplicated     Obesity, Class I, BMI 30.0-34.9 (see actual BMI)     Psoriatic arthritis     Thyroid disease      Past Surgical History:   Procedure Laterality Date    BREAST LUMPECTOMY Left 10/03/2023    CHOLECYSTECTOMY      cold knife cone      COLONOSCOPY      EGD - EXTERNAL RESULT      SHOULDER SURGERY Right     TONSILLECTOMY      TOTAL ABDOMINAL HYSTERECTOMY W/ BILATERAL SALPINGOOPHORECTOMY  12/20/2023     Family History   Problem Relation Age of Onset    Hypertension Mother     Heart disease Mother     Diabetes Father     Heart disease Father     Heart disease Brother     Diabetes Brother     Colon polyps Neg Hx     Colon cancer Neg Hx     Crohn's disease Neg Hx     Esophageal cancer Neg Hx     Irritable bowel syndrome Neg Hx     Rectal cancer Neg Hx     Stomach cancer Neg Hx     Ulcerative colitis Neg Hx      Social History     Tobacco Use    Smoking status: Never    Smokeless tobacco: Never   Substance Use Topics    Alcohol use: Yes     Alcohol/week: 1.0 standard drink of alcohol     Types: 1 Glasses of wine per week    Drug use: Never     Review of patient's allergies indicates:   Allergen Reactions    Levofloxacin Other (See Comments)      Medication List with Changes/Refills   New Medications    SUCRALFATE (CARAFATE) 1 GRAM TABLET    Take 1 tablet (1 g total) by mouth 4 (four) times daily before meals and nightly. for 10 days     "SUCRALFATE (CARAFATE) 100 MG/ML SUSPENSION    Take 10 mLs (1 g total) by mouth 4 (four) times daily before meals and nightly.   Current Medications    ALPRAZOLAM (XANAX) 0.5 MG TABLET    alprazolam 0.5 mg tablet   TAKE 1 TABLET BY MOUTH ONCE DAILY AS NEEDED FOR ANXIETY    CETIRIZINE (ZYRTEC) 10 MG TABLET        EPINEPHRINE (EPIPEN) 0.3 MG/0.3 ML ATIN    epinephrine 0.3 mg/0.3 mL injection, auto-injector    ERGOCALCIFEROL (ERGOCALCIFEROL) 50,000 UNIT CAP    Take 1 capsule by mouth every 7 days.    HYDROXYCHLOROQUINE (PLAQUENIL) 200 MG TABLET        METFORMIN (GLUCOPHAGE) 500 MG TABLET    Take 500 mg by mouth.    METOPROLOL SUCCINATE (TOPROL-XL) 25 MG 24 HR TABLET    Take 1 tablet (25 mg total) by mouth once daily.    NP THYROID 120 MG TAB    TAKE 2 TABLETS BY MOUTH ONCE DAILY ON AN EMPTY STOMACH    OMALIZUMAB (XOLAIR) 150 MG/ML INJECTION    Inject 1 mg into the skin every 28 days.    PANTOPRAZOLE (PROTONIX) 40 MG TABLET    Take 1 tablet (40 mg total) by mouth 2 (two) times daily.    TIRZEPATIDE 12.5 MG/0.5 ML PNIJ    Inject 12.5 mg into the skin every 7 days.    TRELEGY ELLIPTA 100-62.5-25 MCG DSDV    INHALE 1 PUFF ONCE DAILY    VALACYCLOVIR (VALTREX) 1000 MG TABLET    Take 2,000 mg by mouth 2 (two) times daily. FOR 1 DAY    VILAZODONE (VIIBRYD) 40 MG TAB TABLET    Take 40 mg by mouth.   Discontinued Medications    BUSPIRONE (BUSPAR) 10 MG TABLET    Take 1 tablet by mouth 2 (two) times daily.    FOLIC ACID (FOLVITE) 1 MG TABLET    Take 1,000 mcg by mouth.    OXYCODONE-ACETAMINOPHEN (PERCOCET) 5-325 MG PER TABLET    Take 1 tablet by mouth every 4 (four) hours as needed for Pain.         Vital Signs:  /68 (BP Location: Left arm, Patient Position: Sitting)   Pulse 78   Ht 5' 3" (1.6 m)   Wt 85.2 kg (187 lb 12.8 oz)   LMP 10/27/2023   SpO2 98%   BMI 33.27 kg/m²        Physical Exam  Vitals reviewed.   Constitutional:       General: She is awake. She is not in acute distress.     Appearance: Normal appearance. " She is well-developed. She is not ill-appearing, toxic-appearing or diaphoretic.   HENT:      Head: Normocephalic and atraumatic.      Nose: Nose normal.      Mouth/Throat:      Mouth: Mucous membranes are moist.      Pharynx: Oropharynx is clear. No oropharyngeal exudate or posterior oropharyngeal erythema.   Eyes:      General: Lids are normal. Gaze aligned appropriately. No scleral icterus.        Right eye: No discharge.         Left eye: No discharge.      Conjunctiva/sclera: Conjunctivae normal.   Neck:      Trachea: Trachea normal.   Cardiovascular:      Rate and Rhythm: Normal rate and regular rhythm.      Pulses:           Radial pulses are 2+ on the right side and 2+ on the left side.   Pulmonary:      Effort: Pulmonary effort is normal. No respiratory distress.      Breath sounds: No stridor. No wheezing.   Chest:      Chest wall: No tenderness.   Abdominal:      General: Bowel sounds are normal. There is no distension.      Palpations: Abdomen is soft. There is no fluid wave, hepatomegaly or mass.      Tenderness: There is no abdominal tenderness. There is no guarding or rebound.   Musculoskeletal:         General: No tenderness or deformity.      Cervical back: Full passive range of motion without pain and neck supple. No tenderness.      Right lower leg: No edema.      Left lower leg: No edema.   Lymphadenopathy:      Cervical: No cervical adenopathy.   Skin:     General: Skin is warm and dry.      Capillary Refill: Capillary refill takes less than 2 seconds.      Coloration: Skin is not cyanotic, jaundiced or pale.   Neurological:      General: No focal deficit present.      Mental Status: She is alert and oriented to person, place, and time.      Motor: No tremor.   Psychiatric:         Attention and Perception: Attention normal.         Mood and Affect: Mood and affect normal.         Speech: Speech normal.         Behavior: Behavior normal. Behavior is cooperative.            All of the data above  and below has been reviewed by myself and any further interpretations will be reflected in the assessment and plan.   The data includes review of external notes, and independent interpretation of lab results, procedures, x-rays, and imaging reports.      Assessment:  Other dysphagia    Jackhammer esophagus    Gastroesophageal reflux disease, unspecified whether esophagitis present    Epigastric pain    Right upper quadrant pain    Other orders  -     sucralfate (CARAFATE) 100 mg/mL suspension; Take 10 mLs (1 g total) by mouth 4 (four) times daily before meals and nightly.  Dispense: 414 mL; Refill: 0  -     sucralfate (CARAFATE) 1 gram tablet; Take 1 tablet (1 g total) by mouth 4 (four) times daily before meals and nightly. for 10 days  Dispense: 40 tablet; Refill: 0    Epigastric pain: Adhesions v infl v ulcer. Trial of Carafate.   Dysphagia: Since radiation therapy. Improving now that radiation therapy complete/panto 40 BID. Will plan for esophagram and MBSS. Will also plan for repeat EGD to ensure no infl/stricture/lesion etc. Will discuss timing with Dr. Deluca. Will give copy of EGD instructions.  Her BP was been running low. Current dysphagia symptoms seem related to radiation. Would not rec CCB at this time.     Recommendations:  Continue pantoprazole 40 mg twice a day.   May try Carafate 4 times a day as needed, before meals and at bedtime.   Schedule swallow study.     Risks, benefits, and alternatives of medical management, any associated procedures, and/or treatment discussed with the patient. Patient given opportunity to ask questions and voices understanding. Patient has elected to proceed with the recommended care modalities as discussed.    Keep office visit with Dr. Deluca.     Order summary:  No orders of the defined types were placed in this encounter.       Instructed patient to notify my office if they have not been contacted within two weeks after any procedures, submitting any samples (biopsies,  blood, stool, urine, etc.) or after any imaging (X-ray, CT, MRI, etc.).      Suha Blake NP    This document may have been created using a voice recognition transcribing system. Incorrect words or phrases may have been missed during proofreading. Please interpret accordingly or contact me for clarification.      The patient is a 79y Female complaining of flu-like symptoms.
